# Patient Record
Sex: MALE | Race: WHITE | Employment: OTHER | URBAN - NONMETROPOLITAN AREA
[De-identification: names, ages, dates, MRNs, and addresses within clinical notes are randomized per-mention and may not be internally consistent; named-entity substitution may affect disease eponyms.]

---

## 2019-01-01 ENCOUNTER — APPOINTMENT (OUTPATIENT)
Dept: CT IMAGING | Age: 74
DRG: 871 | End: 2019-01-01
Payer: MEDICARE

## 2019-01-01 ENCOUNTER — APPOINTMENT (OUTPATIENT)
Dept: GENERAL RADIOLOGY | Age: 74
DRG: 871 | End: 2019-01-01
Payer: MEDICARE

## 2019-01-01 ENCOUNTER — APPOINTMENT (OUTPATIENT)
Dept: INTERVENTIONAL RADIOLOGY/VASCULAR | Age: 74
DRG: 871 | End: 2019-01-01
Payer: MEDICARE

## 2019-01-01 ENCOUNTER — HOSPITAL ENCOUNTER (INPATIENT)
Age: 74
LOS: 17 days | Discharge: NURSING, REHAB OR LONG-TERM CARE | DRG: 871 | End: 2019-09-06
Attending: FAMILY MEDICINE | Admitting: INTERNAL MEDICINE
Payer: MEDICARE

## 2019-01-01 ENCOUNTER — APPOINTMENT (OUTPATIENT)
Dept: ULTRASOUND IMAGING | Age: 74
DRG: 871 | End: 2019-01-01
Payer: MEDICARE

## 2019-01-01 ENCOUNTER — APPOINTMENT (OUTPATIENT)
Dept: NUCLEAR MEDICINE | Age: 74
DRG: 871 | End: 2019-01-01
Payer: MEDICARE

## 2019-01-01 VITALS
HEART RATE: 92 BPM | BODY MASS INDEX: 31.27 KG/M2 | HEIGHT: 72 IN | SYSTOLIC BLOOD PRESSURE: 98 MMHG | RESPIRATION RATE: 18 BRPM | TEMPERATURE: 97.6 F | WEIGHT: 230.9 LBS | OXYGEN SATURATION: 96 % | DIASTOLIC BLOOD PRESSURE: 56 MMHG

## 2019-01-01 DIAGNOSIS — J96.01 ACUTE RESPIRATORY FAILURE WITH HYPOXIA (HCC): ICD-10-CM

## 2019-01-01 DIAGNOSIS — J18.9 PNEUMONIA DUE TO ORGANISM: ICD-10-CM

## 2019-01-01 DIAGNOSIS — R50.81 FEVER IN OTHER DISEASES: Primary | ICD-10-CM

## 2019-01-01 DIAGNOSIS — C18.8 OVERLAPPING MALIGNANT NEOPLASM OF COLON (HCC): ICD-10-CM

## 2019-01-01 DIAGNOSIS — R22.1 NECK MASS: ICD-10-CM

## 2019-01-01 DIAGNOSIS — C78.7 LIVER METASTASES (HCC): ICD-10-CM

## 2019-01-01 DIAGNOSIS — R16.0 HEPATOMEGALY: ICD-10-CM

## 2019-01-01 DIAGNOSIS — K52.9 COLITIS: ICD-10-CM

## 2019-01-01 LAB
ABO: NORMAL
ADENOVIRUS F 40 41 PCR: NOT DETECTED
AFP-TUMOR MARKER: 5660 UG/L
ALBUMIN SERPL-MCNC: 2.3 G/DL (ref 3.5–5.1)
ALBUMIN SERPL-MCNC: 2.4 G/DL (ref 3.5–5.1)
ALBUMIN SERPL-MCNC: 2.4 G/DL (ref 3.5–5.1)
ALBUMIN SERPL-MCNC: 2.5 G/DL (ref 3.5–5.1)
ALBUMIN SERPL-MCNC: 2.7 G/DL (ref 3.5–5.1)
ALBUMIN SERPL-MCNC: 2.8 G/DL (ref 3.5–5.1)
ALP BLD-CCNC: 125 U/L (ref 38–126)
ALP BLD-CCNC: 144 U/L (ref 38–126)
ALP BLD-CCNC: 152 U/L (ref 38–126)
ALP BLD-CCNC: 85 U/L (ref 38–126)
ALP BLD-CCNC: 86 U/L (ref 38–126)
ALP BLD-CCNC: 89 U/L (ref 38–126)
ALT SERPL-CCNC: 7 U/L (ref 11–66)
ALT SERPL-CCNC: 7 U/L (ref 11–66)
ALT SERPL-CCNC: 9 U/L (ref 11–66)
ALT SERPL-CCNC: < 5 U/L (ref 11–66)
ANION GAP SERPL CALCULATED.3IONS-SCNC: 10 MEQ/L (ref 8–16)
ANION GAP SERPL CALCULATED.3IONS-SCNC: 10 MEQ/L (ref 8–16)
ANION GAP SERPL CALCULATED.3IONS-SCNC: 11 MEQ/L (ref 8–16)
ANION GAP SERPL CALCULATED.3IONS-SCNC: 12 MEQ/L (ref 8–16)
ANION GAP SERPL CALCULATED.3IONS-SCNC: 13 MEQ/L (ref 8–16)
ANION GAP SERPL CALCULATED.3IONS-SCNC: 17 MEQ/L (ref 8–16)
ANION GAP SERPL CALCULATED.3IONS-SCNC: 6 MEQ/L (ref 8–16)
ANION GAP SERPL CALCULATED.3IONS-SCNC: 8 MEQ/L (ref 8–16)
ANION GAP SERPL CALCULATED.3IONS-SCNC: 9 MEQ/L (ref 8–16)
ANION GAP SERPL CALCULATED.3IONS-SCNC: 9 MEQ/L (ref 8–16)
ANTIBODY SCREEN: NORMAL
AST SERPL-CCNC: 22 U/L (ref 5–40)
AST SERPL-CCNC: 23 U/L (ref 5–40)
AST SERPL-CCNC: 29 U/L (ref 5–40)
AST SERPL-CCNC: 33 U/L (ref 5–40)
AST SERPL-CCNC: 39 U/L (ref 5–40)
AST SERPL-CCNC: 44 U/L (ref 5–40)
ASTROVIRUS PCR: NOT DETECTED
BASOPHILS # BLD: 0.7 %
BASOPHILS # BLD: 1.2 %
BASOPHILS ABSOLUTE: 0 THOU/MM3 (ref 0–0.1)
BASOPHILS ABSOLUTE: 0.1 THOU/MM3 (ref 0–0.1)
BILIRUB SERPL-MCNC: 0.4 MG/DL (ref 0.3–1.2)
BILIRUB SERPL-MCNC: 0.5 MG/DL (ref 0.3–1.2)
BILIRUB SERPL-MCNC: 0.5 MG/DL (ref 0.3–1.2)
BILIRUB SERPL-MCNC: 0.7 MG/DL (ref 0.3–1.2)
BILIRUBIN DIRECT: 0.3 MG/DL (ref 0–0.3)
BILIRUBIN DIRECT: 0.3 MG/DL (ref 0–0.3)
BILIRUBIN DIRECT: 0.4 MG/DL (ref 0–0.3)
BILIRUBIN URINE: ABNORMAL
BLOOD, URINE: NEGATIVE
BODY FLUID RBC: 2000 /CUMM
BUN BLDV-MCNC: 10 MG/DL (ref 7–22)
BUN BLDV-MCNC: 11 MG/DL (ref 7–22)
BUN BLDV-MCNC: 12 MG/DL (ref 7–22)
BUN BLDV-MCNC: 14 MG/DL (ref 7–22)
BUN BLDV-MCNC: 16 MG/DL (ref 7–22)
BUN BLDV-MCNC: 21 MG/DL (ref 7–22)
BUN BLDV-MCNC: 8 MG/DL (ref 7–22)
BUN BLDV-MCNC: 9 MG/DL (ref 7–22)
CA 19-9: 19 U/ML (ref 0–35)
CALCIUM IONIZED: 1.23 MMOL/L (ref 1.12–1.32)
CALCIUM IONIZED: 1.28 MMOL/L (ref 1.12–1.32)
CALCIUM IONIZED: 1.3 MMOL/L (ref 1.12–1.32)
CALCIUM IONIZED: 1.31 MMOL/L (ref 1.12–1.32)
CALCIUM SERPL-MCNC: 8.3 MG/DL (ref 8.5–10.5)
CALCIUM SERPL-MCNC: 8.4 MG/DL (ref 8.5–10.5)
CALCIUM SERPL-MCNC: 8.6 MG/DL (ref 8.5–10.5)
CALCIUM SERPL-MCNC: 8.7 MG/DL (ref 8.5–10.5)
CALCIUM SERPL-MCNC: 8.7 MG/DL (ref 8.5–10.5)
CALCIUM SERPL-MCNC: 8.8 MG/DL (ref 8.5–10.5)
CALCIUM SERPL-MCNC: 8.9 MG/DL (ref 8.5–10.5)
CALCIUM SERPL-MCNC: 9.1 MG/DL (ref 8.5–10.5)
CALCIUM SERPL-MCNC: 9.3 MG/DL (ref 8.5–10.5)
CALCIUM SERPL-MCNC: 9.4 MG/DL (ref 8.5–10.5)
CALCIUM SERPL-MCNC: 9.8 MG/DL (ref 8.5–10.5)
CAMPYLOBACTER PCR: NOT DETECTED
CEA: 0.9 NG/ML (ref 0–5)
CHARACTER, BODY FLUID: NORMAL
CHARACTER, URINE: CLEAR
CHLORIDE BLD-SCNC: 100 MEQ/L (ref 98–111)
CHLORIDE BLD-SCNC: 100 MEQ/L (ref 98–111)
CHLORIDE BLD-SCNC: 101 MEQ/L (ref 98–111)
CHLORIDE BLD-SCNC: 102 MEQ/L (ref 98–111)
CHLORIDE BLD-SCNC: 105 MEQ/L (ref 98–111)
CHLORIDE BLD-SCNC: 105 MEQ/L (ref 98–111)
CHLORIDE BLD-SCNC: 106 MEQ/L (ref 98–111)
CHLORIDE BLD-SCNC: 108 MEQ/L (ref 98–111)
CHLORIDE BLD-SCNC: 98 MEQ/L (ref 98–111)
CHLORIDE BLD-SCNC: 98 MEQ/L (ref 98–111)
CHLORIDE BLD-SCNC: 99 MEQ/L (ref 98–111)
CLOSTRIDIUM DIFFICILE, PCR: NOT DETECTED
CO2: 19 MEQ/L (ref 23–33)
CO2: 23 MEQ/L (ref 23–33)
CO2: 23 MEQ/L (ref 23–33)
CO2: 24 MEQ/L (ref 23–33)
CO2: 25 MEQ/L (ref 23–33)
CO2: 25 MEQ/L (ref 23–33)
CO2: 26 MEQ/L (ref 23–33)
CO2: 26 MEQ/L (ref 23–33)
CO2: 27 MEQ/L (ref 23–33)
COLOR: YELLOW
COLOR: YELLOW
CREAT SERPL-MCNC: 0.3 MG/DL (ref 0.4–1.2)
CREAT SERPL-MCNC: 0.4 MG/DL (ref 0.4–1.2)
CREAT SERPL-MCNC: 0.6 MG/DL (ref 0.4–1.2)
CRYPTOSPORIDIUM PCR: NOT DETECTED
CYCLOSPORA CAYETANENSIS PCR: NOT DETECTED
E COLI 0157 PCR: NORMAL
E COLI ENTEROAGGREGATIVE PCR: NOT DETECTED
E COLI ENTEROPATHOGENIC PCR: NOT DETECTED
E COLI ENTEROTOXIGENIC PCR: NOT DETECTED
E COLI SHIGA LIKE TOXIN PCR: NOT DETECTED
E COLI SHIGELLA/ENTEROINVASIVE PCR: NOT DETECTED
E HISTOLYTICA GI FILM ARRAY: NOT DETECTED
EKG ATRIAL RATE: 67 BPM
EKG ATRIAL RATE: 91 BPM
EKG Q-T INTERVAL: 242 MS
EKG Q-T INTERVAL: 378 MS
EKG QRS DURATION: 80 MS
EKG QRS DURATION: 82 MS
EKG QTC CALCULATION (BAZETT): 346 MS
EKG QTC CALCULATION (BAZETT): 425 MS
EKG R AXIS: 37 DEGREES
EKG R AXIS: 8 DEGREES
EKG T AXIS: -135 DEGREES
EKG T AXIS: 113 DEGREES
EKG VENTRICULAR RATE: 123 BPM
EKG VENTRICULAR RATE: 76 BPM
EOSINOPHIL # BLD: 1 %
EOSINOPHIL # BLD: 1.2 %
EOSINOPHILS ABSOLUTE: 0.1 THOU/MM3 (ref 0–0.4)
EOSINOPHILS ABSOLUTE: 0.1 THOU/MM3 (ref 0–0.4)
ERYTHROCYTE [DISTWIDTH] IN BLOOD BY AUTOMATED COUNT: 14.7 % (ref 11.5–14.5)
ERYTHROCYTE [DISTWIDTH] IN BLOOD BY AUTOMATED COUNT: 14.8 % (ref 11.5–14.5)
ERYTHROCYTE [DISTWIDTH] IN BLOOD BY AUTOMATED COUNT: 14.8 % (ref 11.5–14.5)
ERYTHROCYTE [DISTWIDTH] IN BLOOD BY AUTOMATED COUNT: 14.9 % (ref 11.5–14.5)
ERYTHROCYTE [DISTWIDTH] IN BLOOD BY AUTOMATED COUNT: 14.9 % (ref 11.5–14.5)
ERYTHROCYTE [DISTWIDTH] IN BLOOD BY AUTOMATED COUNT: 15 % (ref 11.5–14.5)
ERYTHROCYTE [DISTWIDTH] IN BLOOD BY AUTOMATED COUNT: 15 % (ref 11.5–14.5)
ERYTHROCYTE [DISTWIDTH] IN BLOOD BY AUTOMATED COUNT: 15.1 % (ref 11.5–14.5)
ERYTHROCYTE [DISTWIDTH] IN BLOOD BY AUTOMATED COUNT: 15.4 % (ref 11.5–14.5)
ERYTHROCYTE [DISTWIDTH] IN BLOOD BY AUTOMATED COUNT: 54 FL (ref 35–45)
ERYTHROCYTE [DISTWIDTH] IN BLOOD BY AUTOMATED COUNT: 54.1 FL (ref 35–45)
ERYTHROCYTE [DISTWIDTH] IN BLOOD BY AUTOMATED COUNT: 54.2 FL (ref 35–45)
ERYTHROCYTE [DISTWIDTH] IN BLOOD BY AUTOMATED COUNT: 54.4 FL (ref 35–45)
ERYTHROCYTE [DISTWIDTH] IN BLOOD BY AUTOMATED COUNT: 54.6 FL (ref 35–45)
ERYTHROCYTE [DISTWIDTH] IN BLOOD BY AUTOMATED COUNT: 54.9 FL (ref 35–45)
ERYTHROCYTE [DISTWIDTH] IN BLOOD BY AUTOMATED COUNT: 55 FL (ref 35–45)
ERYTHROCYTE [DISTWIDTH] IN BLOOD BY AUTOMATED COUNT: 56.3 FL (ref 35–45)
ERYTHROCYTE [DISTWIDTH] IN BLOOD BY AUTOMATED COUNT: 58.3 FL (ref 35–45)
GFR SERPL CREATININE-BSD FRML MDRD: > 90 ML/MIN/1.73M2
GIARDIA LAMBLIA PCR: NOT DETECTED
GLUCOSE BLD-MCNC: 102 MG/DL (ref 70–108)
GLUCOSE BLD-MCNC: 102 MG/DL (ref 70–108)
GLUCOSE BLD-MCNC: 103 MG/DL (ref 70–108)
GLUCOSE BLD-MCNC: 104 MG/DL (ref 70–108)
GLUCOSE BLD-MCNC: 108 MG/DL (ref 70–108)
GLUCOSE BLD-MCNC: 109 MG/DL (ref 70–108)
GLUCOSE BLD-MCNC: 113 MG/DL (ref 70–108)
GLUCOSE BLD-MCNC: 114 MG/DL (ref 70–108)
GLUCOSE BLD-MCNC: 114 MG/DL (ref 70–108)
GLUCOSE BLD-MCNC: 115 MG/DL (ref 70–108)
GLUCOSE BLD-MCNC: 115 MG/DL (ref 70–108)
GLUCOSE BLD-MCNC: 117 MG/DL (ref 70–108)
GLUCOSE BLD-MCNC: 117 MG/DL (ref 70–108)
GLUCOSE BLD-MCNC: 120 MG/DL (ref 70–108)
GLUCOSE BLD-MCNC: 121 MG/DL (ref 70–108)
GLUCOSE BLD-MCNC: 121 MG/DL (ref 70–108)
GLUCOSE BLD-MCNC: 124 MG/DL (ref 70–108)
GLUCOSE BLD-MCNC: 125 MG/DL (ref 70–108)
GLUCOSE BLD-MCNC: 126 MG/DL (ref 70–108)
GLUCOSE BLD-MCNC: 126 MG/DL (ref 70–108)
GLUCOSE BLD-MCNC: 127 MG/DL (ref 70–108)
GLUCOSE BLD-MCNC: 128 MG/DL (ref 70–108)
GLUCOSE BLD-MCNC: 129 MG/DL (ref 70–108)
GLUCOSE BLD-MCNC: 130 MG/DL (ref 70–108)
GLUCOSE BLD-MCNC: 131 MG/DL (ref 70–108)
GLUCOSE BLD-MCNC: 131 MG/DL (ref 70–108)
GLUCOSE BLD-MCNC: 136 MG/DL (ref 70–108)
GLUCOSE BLD-MCNC: 138 MG/DL (ref 70–108)
GLUCOSE BLD-MCNC: 140 MG/DL (ref 70–108)
GLUCOSE BLD-MCNC: 141 MG/DL (ref 70–108)
GLUCOSE BLD-MCNC: 149 MG/DL (ref 70–108)
GLUCOSE BLD-MCNC: 156 MG/DL (ref 70–108)
GLUCOSE BLD-MCNC: 160 MG/DL (ref 70–108)
GLUCOSE BLD-MCNC: 162 MG/DL (ref 70–108)
GLUCOSE BLD-MCNC: 165 MG/DL (ref 70–108)
GLUCOSE BLD-MCNC: 166 MG/DL (ref 70–108)
GLUCOSE BLD-MCNC: 167 MG/DL (ref 70–108)
GLUCOSE BLD-MCNC: 228 MG/DL (ref 70–108)
GLUCOSE BLD-MCNC: 83 MG/DL (ref 70–108)
GLUCOSE BLD-MCNC: 90 MG/DL (ref 70–108)
GLUCOSE BLD-MCNC: 93 MG/DL (ref 70–108)
GLUCOSE BLD-MCNC: 99 MG/DL (ref 70–108)
GLUCOSE URINE: NEGATIVE MG/DL
GLUCOSE, FLUID: 133 MG/DL
HCT VFR BLD CALC: 29.4 % (ref 42–52)
HCT VFR BLD CALC: 29.9 % (ref 42–52)
HCT VFR BLD CALC: 31.9 % (ref 42–52)
HCT VFR BLD CALC: 32.2 % (ref 42–52)
HCT VFR BLD CALC: 32.5 % (ref 42–52)
HCT VFR BLD CALC: 32.7 % (ref 42–52)
HCT VFR BLD CALC: 32.8 % (ref 42–52)
HCT VFR BLD CALC: 33.3 % (ref 42–52)
HCT VFR BLD CALC: 33.8 % (ref 42–52)
HCT VFR BLD CALC: 34 % (ref 42–52)
HCT VFR BLD CALC: 34.4 % (ref 42–52)
HCT VFR BLD CALC: 34.6 % (ref 42–52)
HCT VFR BLD CALC: 35.3 % (ref 42–52)
HCT VFR BLD CALC: 38.3 % (ref 42–52)
HEMOCCULT STL QL: NEGATIVE
HEMOGLOBIN: 10 GM/DL (ref 14–18)
HEMOGLOBIN: 10.1 GM/DL (ref 14–18)
HEMOGLOBIN: 10.1 GM/DL (ref 14–18)
HEMOGLOBIN: 10.2 GM/DL (ref 14–18)
HEMOGLOBIN: 10.2 GM/DL (ref 14–18)
HEMOGLOBIN: 10.3 GM/DL (ref 14–18)
HEMOGLOBIN: 10.3 GM/DL (ref 14–18)
HEMOGLOBIN: 10.4 GM/DL (ref 14–18)
HEMOGLOBIN: 10.4 GM/DL (ref 14–18)
HEMOGLOBIN: 10.5 GM/DL (ref 14–18)
HEMOGLOBIN: 10.6 GM/DL (ref 14–18)
HEMOGLOBIN: 10.6 GM/DL (ref 14–18)
HEMOGLOBIN: 10.7 GM/DL (ref 14–18)
HEMOGLOBIN: 10.7 GM/DL (ref 14–18)
HEMOGLOBIN: 10.8 GM/DL (ref 14–18)
HEMOGLOBIN: 11 GM/DL (ref 14–18)
HEMOGLOBIN: 11 GM/DL (ref 14–18)
HEMOGLOBIN: 11.3 GM/DL (ref 14–18)
HEMOGLOBIN: 9.1 GM/DL (ref 14–18)
HEMOGLOBIN: 9.3 GM/DL (ref 14–18)
HEMOGLOBIN: 9.8 GM/DL (ref 14–18)
HEMOGLOBIN: 9.9 GM/DL (ref 14–18)
HEMOGLOBIN: 9.9 GM/DL (ref 14–18)
ICTOTEST: NEGATIVE
IMMATURE GRANS (ABS): 0.01 THOU/MM3 (ref 0–0.07)
IMMATURE GRANS (ABS): 0.03 THOU/MM3 (ref 0–0.07)
IMMATURE GRANULOCYTES: 0 %
IMMATURE GRANULOCYTES: 1 %
INR BLD: 1 (ref 0.85–1.13)
KETONES, URINE: 15
LACTIC ACID: 1.1 MMOL/L (ref 0.5–2.2)
LACTIC ACID: 2 MMOL/L (ref 0.5–2.2)
LD, FLUID: 55 U/L
LEUKOCYTE ESTERASE, URINE: NEGATIVE
LIPASE: 39.2 U/L (ref 5.6–51.3)
LV EF: 55 %
LVEF MODALITY: NORMAL
LYMPHOCYTES # BLD: 13.1 %
LYMPHOCYTES # BLD: 14.6 %
LYMPHOCYTES ABSOLUTE: 0.6 THOU/MM3 (ref 1–4.8)
LYMPHOCYTES ABSOLUTE: 0.7 THOU/MM3 (ref 1–4.8)
MAGNESIUM: 1.4 MG/DL (ref 1.6–2.4)
MAGNESIUM: 1.5 MG/DL (ref 1.6–2.4)
MAGNESIUM: 1.5 MG/DL (ref 1.6–2.4)
MAGNESIUM: 1.6 MG/DL (ref 1.6–2.4)
MAGNESIUM: 1.6 MG/DL (ref 1.6–2.4)
MAGNESIUM: 1.7 MG/DL (ref 1.6–2.4)
MAGNESIUM: 1.7 MG/DL (ref 1.6–2.4)
MAGNESIUM: 1.8 MG/DL (ref 1.6–2.4)
MAGNESIUM: 1.9 MG/DL (ref 1.6–2.4)
MCH RBC QN AUTO: 30.4 PG (ref 26–33)
MCH RBC QN AUTO: 30.4 PG (ref 26–33)
MCH RBC QN AUTO: 30.7 PG (ref 26–33)
MCH RBC QN AUTO: 30.8 PG (ref 26–33)
MCH RBC QN AUTO: 30.9 PG (ref 26–33)
MCH RBC QN AUTO: 31 PG (ref 26–33)
MCH RBC QN AUTO: 31.1 PG (ref 26–33)
MCH RBC QN AUTO: 31.1 PG (ref 26–33)
MCH RBC QN AUTO: 31.2 PG (ref 26–33)
MCH RBC QN AUTO: 31.2 PG (ref 26–33)
MCH RBC QN AUTO: 31.3 PG (ref 26–33)
MCHC RBC AUTO-ENTMCNC: 29.5 GM/DL (ref 32.2–35.5)
MCHC RBC AUTO-ENTMCNC: 30 GM/DL (ref 32.2–35.5)
MCHC RBC AUTO-ENTMCNC: 30.5 GM/DL (ref 32.2–35.5)
MCHC RBC AUTO-ENTMCNC: 30.8 GM/DL (ref 32.2–35.5)
MCHC RBC AUTO-ENTMCNC: 30.9 GM/DL (ref 32.2–35.5)
MCHC RBC AUTO-ENTMCNC: 30.9 GM/DL (ref 32.2–35.5)
MCHC RBC AUTO-ENTMCNC: 31 GM/DL (ref 32.2–35.5)
MCHC RBC AUTO-ENTMCNC: 31 GM/DL (ref 32.2–35.5)
MCHC RBC AUTO-ENTMCNC: 31.1 GM/DL (ref 32.2–35.5)
MCHC RBC AUTO-ENTMCNC: 31.2 GM/DL (ref 32.2–35.5)
MCHC RBC AUTO-ENTMCNC: 31.2 GM/DL (ref 32.2–35.5)
MCV RBC AUTO: 100 FL (ref 80–94)
MCV RBC AUTO: 100.3 FL (ref 80–94)
MCV RBC AUTO: 100.6 FL (ref 80–94)
MCV RBC AUTO: 100.7 FL (ref 80–94)
MCV RBC AUTO: 101.6 FL (ref 80–94)
MCV RBC AUTO: 106.1 FL (ref 80–94)
MCV RBC AUTO: 99.1 FL (ref 80–94)
MCV RBC AUTO: 99.1 FL (ref 80–94)
MCV RBC AUTO: 99.7 FL (ref 80–94)
MISC. #1 REFERENCE GROUP TEST: NORMAL
MISC. #2 REFERENCE REPORT: NORMAL
MONOCYTES # BLD: 7.2 %
MONOCYTES # BLD: 9.2 %
MONOCYTES ABSOLUTE: 0.3 THOU/MM3 (ref 0.4–1.3)
MONOCYTES ABSOLUTE: 0.5 THOU/MM3 (ref 0.4–1.3)
MONONUCLEAR CELLS BODY FLUID: 98.2 %
MRSA SCREEN RT-PCR: NEGATIVE
MRSA SCREEN: NORMAL
NITRITE, URINE: NEGATIVE
NOROVIRUS GI GII PCR: NOT DETECTED
NUCLEATED RED BLOOD CELLS: 0 /100 WBC
NUCLEATED RED BLOOD CELLS: 0 /100 WBC
OSMOLALITY CALCULATION: 270.4 MOSMOL/KG (ref 275–300)
OVA AND PARASITES: NORMAL
PATHOLOGIST REVIEW: NORMAL
PH UA: 5 (ref 5–9)
PHOSPHORUS: 1.9 MG/DL (ref 2.4–4.7)
PHOSPHORUS: 2.1 MG/DL (ref 2.4–4.7)
PHOSPHORUS: 2.1 MG/DL (ref 2.4–4.7)
PHOSPHORUS: 2.5 MG/DL (ref 2.4–4.7)
PHOSPHORUS: 2.8 MG/DL (ref 2.4–4.7)
PLATELET # BLD: 193 THOU/MM3 (ref 130–400)
PLATELET # BLD: 197 THOU/MM3 (ref 130–400)
PLATELET # BLD: 199 THOU/MM3 (ref 130–400)
PLATELET # BLD: 205 THOU/MM3 (ref 130–400)
PLATELET # BLD: 206 THOU/MM3 (ref 130–400)
PLATELET # BLD: 208 THOU/MM3 (ref 130–400)
PLATELET # BLD: 210 THOU/MM3 (ref 130–400)
PLATELET # BLD: 211 THOU/MM3 (ref 130–400)
PLATELET # BLD: 217 THOU/MM3 (ref 130–400)
PLATELET # BLD: 222 THOU/MM3 (ref 130–400)
PLATELET # BLD: 230 THOU/MM3 (ref 130–400)
PLESIOMONAS SHIGELLOIDES PCR: NOT DETECTED
PMV BLD AUTO: 8.9 FL (ref 9.4–12.4)
PMV BLD AUTO: 9 FL (ref 9.4–12.4)
PMV BLD AUTO: 9.1 FL (ref 9.4–12.4)
PMV BLD AUTO: 9.1 FL (ref 9.4–12.4)
PMV BLD AUTO: 9.2 FL (ref 9.4–12.4)
POLYMORPHONUCLEAR CELLS BODY FLUID: 1.8 %
POTASSIUM SERPL-SCNC: 3 MEQ/L (ref 3.5–5.2)
POTASSIUM SERPL-SCNC: 3 MEQ/L (ref 3.5–5.2)
POTASSIUM SERPL-SCNC: 3.1 MEQ/L (ref 3.5–5.2)
POTASSIUM SERPL-SCNC: 3.2 MEQ/L (ref 3.5–5.2)
POTASSIUM SERPL-SCNC: 3.3 MEQ/L (ref 3.5–5.2)
POTASSIUM SERPL-SCNC: 3.4 MEQ/L (ref 3.5–5.2)
POTASSIUM SERPL-SCNC: 3.4 MEQ/L (ref 3.5–5.2)
POTASSIUM SERPL-SCNC: 3.5 MEQ/L (ref 3.5–5.2)
POTASSIUM SERPL-SCNC: 3.6 MEQ/L (ref 3.5–5.2)
POTASSIUM SERPL-SCNC: 3.6 MEQ/L (ref 3.5–5.2)
POTASSIUM SERPL-SCNC: 3.7 MEQ/L (ref 3.5–5.2)
POTASSIUM SERPL-SCNC: 3.7 MEQ/L (ref 3.5–5.2)
POTASSIUM SERPL-SCNC: 3.8 MEQ/L (ref 3.5–5.2)
POTASSIUM SERPL-SCNC: 4 MEQ/L (ref 3.5–5.2)
POTASSIUM SERPL-SCNC: 5.2 MEQ/L (ref 3.5–5.2)
PROCALCITONIN: 0.33 NG/ML (ref 0.01–0.09)
PROCALCITONIN: 1.79 NG/ML (ref 0.01–0.09)
PROTEIN FLUID: 1.9 GM/DL
PROTEIN UA: NEGATIVE
RBC # BLD: 2.92 MILL/MM3 (ref 4.7–6.1)
RBC # BLD: 2.99 MILL/MM3 (ref 4.7–6.1)
RBC # BLD: 3.22 MILL/MM3 (ref 4.7–6.1)
RBC # BLD: 3.22 MILL/MM3 (ref 4.7–6.1)
RBC # BLD: 3.32 MILL/MM3 (ref 4.7–6.1)
RBC # BLD: 3.38 MILL/MM3 (ref 4.7–6.1)
RBC # BLD: 3.43 MILL/MM3 (ref 4.7–6.1)
RBC # BLD: 3.44 MILL/MM3 (ref 4.7–6.1)
RBC # BLD: 3.45 MILL/MM3 (ref 4.7–6.1)
RBC # BLD: 3.53 MILL/MM3 (ref 4.7–6.1)
RBC # BLD: 3.61 MILL/MM3 (ref 4.7–6.1)
RH FACTOR: NORMAL
ROTAVIRUS A PCR: NOT DETECTED
SALMONELLA PCR: NOT DETECTED
SAPOVIRUS PCR: NOT DETECTED
SEG NEUTROPHILS: 75.3 %
SEG NEUTROPHILS: 75.6 %
SEGMENTED NEUTROPHILS ABSOLUTE COUNT: 3.2 THOU/MM3 (ref 1.8–7.7)
SEGMENTED NEUTROPHILS ABSOLUTE COUNT: 3.8 THOU/MM3 (ref 1.8–7.7)
SODIUM BLD-SCNC: 133 MEQ/L (ref 135–145)
SODIUM BLD-SCNC: 134 MEQ/L (ref 135–145)
SODIUM BLD-SCNC: 134 MEQ/L (ref 135–145)
SODIUM BLD-SCNC: 136 MEQ/L (ref 135–145)
SODIUM BLD-SCNC: 137 MEQ/L (ref 135–145)
SODIUM BLD-SCNC: 138 MEQ/L (ref 135–145)
SODIUM BLD-SCNC: 138 MEQ/L (ref 135–145)
SODIUM BLD-SCNC: 139 MEQ/L (ref 135–145)
SODIUM BLD-SCNC: 140 MEQ/L (ref 135–145)
SPECIFIC GRAVITY, URINE: > 1.03 (ref 1–1.03)
SPECIMEN: NORMAL
T4 FREE: 1.12 NG/DL (ref 0.93–1.76)
TOTAL NUCLEATED CELLS BODY FLUID: 58 /CUMM (ref 0–500)
TOTAL PROTEIN: 5.5 G/DL (ref 6.1–8)
TOTAL PROTEIN: 5.7 G/DL (ref 6.1–8)
TOTAL PROTEIN: 5.9 G/DL (ref 6.1–8)
TOTAL PROTEIN: 6 G/DL (ref 6.1–8)
TOTAL PROTEIN: 6.1 G/DL (ref 6.1–8)
TOTAL PROTEIN: 6.8 G/DL (ref 6.1–8)
TOTAL VOLUME RECEIVED BODY FLUID: 83 ML
TROPONIN T: < 0.01 NG/ML
TSH SERPL DL<=0.05 MIU/L-ACNC: 7.16 UIU/ML (ref 0.4–4.2)
UROBILINOGEN, URINE: 0.2 EU/DL (ref 0–1)
VANCOMYCIN RESISTANT ENTEROCOCCUS: NEGATIVE
VIBRIO CHOLERAE PCR: NOT DETECTED
VIBRIO PCR: NOT DETECTED
WBC # BLD: 3.7 THOU/MM3 (ref 4.8–10.8)
WBC # BLD: 3.8 THOU/MM3 (ref 4.8–10.8)
WBC # BLD: 3.9 THOU/MM3 (ref 4.8–10.8)
WBC # BLD: 4 THOU/MM3 (ref 4.8–10.8)
WBC # BLD: 4.1 THOU/MM3 (ref 4.8–10.8)
WBC # BLD: 4.2 THOU/MM3 (ref 4.8–10.8)
WBC # BLD: 4.2 THOU/MM3 (ref 4.8–10.8)
WBC # BLD: 4.3 THOU/MM3 (ref 4.8–10.8)
WBC # BLD: 4.4 THOU/MM3 (ref 4.8–10.8)
WBC # BLD: 5 THOU/MM3 (ref 4.8–10.8)
WBC # BLD: 5 THOU/MM3 (ref 4.8–10.8)
YERSINIA ENTEROCOLITICA PCR: NOT DETECTED

## 2019-01-01 PROCEDURE — 97530 THERAPEUTIC ACTIVITIES: CPT

## 2019-01-01 PROCEDURE — 2060000000 HC ICU INTERMEDIATE R&B

## 2019-01-01 PROCEDURE — 85027 COMPLETE CBC AUTOMATED: CPT

## 2019-01-01 PROCEDURE — 47000 NEEDLE BIOPSY OF LIVER PERQ: CPT

## 2019-01-01 PROCEDURE — 2500000003 HC RX 250 WO HCPCS: Performed by: INTERNAL MEDICINE

## 2019-01-01 PROCEDURE — 6360000002 HC RX W HCPCS: Performed by: PHYSICIAN ASSISTANT

## 2019-01-01 PROCEDURE — 82330 ASSAY OF CALCIUM: CPT

## 2019-01-01 PROCEDURE — 6360000002 HC RX W HCPCS: Performed by: NURSE PRACTITIONER

## 2019-01-01 PROCEDURE — 80048 BASIC METABOLIC PNL TOTAL CA: CPT

## 2019-01-01 PROCEDURE — 6370000000 HC RX 637 (ALT 250 FOR IP): Performed by: INTERNAL MEDICINE

## 2019-01-01 PROCEDURE — 2709999900 HC NON-CHARGEABLE SUPPLY

## 2019-01-01 PROCEDURE — 85025 COMPLETE CBC W/AUTO DIFF WBC: CPT

## 2019-01-01 PROCEDURE — 82248 BILIRUBIN DIRECT: CPT

## 2019-01-01 PROCEDURE — 97110 THERAPEUTIC EXERCISES: CPT

## 2019-01-01 PROCEDURE — 99232 SBSQ HOSP IP/OBS MODERATE 35: CPT | Performed by: INTERNAL MEDICINE

## 2019-01-01 PROCEDURE — 83735 ASSAY OF MAGNESIUM: CPT

## 2019-01-01 PROCEDURE — 84157 ASSAY OF PROTEIN OTHER: CPT

## 2019-01-01 PROCEDURE — 36415 COLL VENOUS BLD VENIPUNCTURE: CPT

## 2019-01-01 PROCEDURE — C9113 INJ PANTOPRAZOLE SODIUM, VIA: HCPCS | Performed by: INTERNAL MEDICINE

## 2019-01-01 PROCEDURE — 51702 INSERT TEMP BLADDER CATH: CPT

## 2019-01-01 PROCEDURE — 84100 ASSAY OF PHOSPHORUS: CPT

## 2019-01-01 PROCEDURE — 6370000000 HC RX 637 (ALT 250 FOR IP)

## 2019-01-01 PROCEDURE — 6360000002 HC RX W HCPCS: Performed by: INTERNAL MEDICINE

## 2019-01-01 PROCEDURE — 99285 EMERGENCY DEPT VISIT HI MDM: CPT

## 2019-01-01 PROCEDURE — 78306 BONE IMAGING WHOLE BODY: CPT

## 2019-01-01 PROCEDURE — 6360000004 HC RX CONTRAST MEDICATION: Performed by: INTERNAL MEDICINE

## 2019-01-01 PROCEDURE — C9113 INJ PANTOPRAZOLE SODIUM, VIA: HCPCS | Performed by: NURSE PRACTITIONER

## 2019-01-01 PROCEDURE — 99232 SBSQ HOSP IP/OBS MODERATE 35: CPT | Performed by: SURGERY

## 2019-01-01 PROCEDURE — 71045 X-RAY EXAM CHEST 1 VIEW: CPT

## 2019-01-01 PROCEDURE — 82272 OCCULT BLD FECES 1-3 TESTS: CPT

## 2019-01-01 PROCEDURE — 74018 RADEX ABDOMEN 1 VIEW: CPT

## 2019-01-01 PROCEDURE — 88342 IMHCHEM/IMCYTCHM 1ST ANTB: CPT

## 2019-01-01 PROCEDURE — 99233 SBSQ HOSP IP/OBS HIGH 50: CPT | Performed by: PHYSICIAN ASSISTANT

## 2019-01-01 PROCEDURE — 97535 SELF CARE MNGMENT TRAINING: CPT

## 2019-01-01 PROCEDURE — 3609009500 HC COLONOSCOPY DIAGNOSTIC OR SCREENING: Performed by: INTERNAL MEDICINE

## 2019-01-01 PROCEDURE — C1729 CATH, DRAINAGE: HCPCS | Performed by: INTERNAL MEDICINE

## 2019-01-01 PROCEDURE — APPSS180 APP SPLIT SHARED TIME > 60 MINUTES: Performed by: PHYSICIAN ASSISTANT

## 2019-01-01 PROCEDURE — 99152 MOD SED SAME PHYS/QHP 5/>YRS: CPT | Performed by: INTERNAL MEDICINE

## 2019-01-01 PROCEDURE — 99233 SBSQ HOSP IP/OBS HIGH 50: CPT | Performed by: INTERNAL MEDICINE

## 2019-01-01 PROCEDURE — 82948 REAGENT STRIP/BLOOD GLUCOSE: CPT

## 2019-01-01 PROCEDURE — 2500000003 HC RX 250 WO HCPCS: Performed by: COUNSELOR

## 2019-01-01 PROCEDURE — 6370000000 HC RX 637 (ALT 250 FOR IP): Performed by: PHYSICIAN ASSISTANT

## 2019-01-01 PROCEDURE — 80053 COMPREHEN METABOLIC PANEL: CPT

## 2019-01-01 PROCEDURE — 83615 LACTATE (LD) (LDH) ENZYME: CPT

## 2019-01-01 PROCEDURE — 84484 ASSAY OF TROPONIN QUANT: CPT

## 2019-01-01 PROCEDURE — 36568 INSJ PICC <5 YR W/O IMAGING: CPT

## 2019-01-01 PROCEDURE — 99223 1ST HOSP IP/OBS HIGH 75: CPT | Performed by: SURGERY

## 2019-01-01 PROCEDURE — 2580000003 HC RX 258

## 2019-01-01 PROCEDURE — 77012 CT SCAN FOR NEEDLE BIOPSY: CPT

## 2019-01-01 PROCEDURE — 83690 ASSAY OF LIPASE: CPT

## 2019-01-01 PROCEDURE — 1200000000 HC SEMI PRIVATE

## 2019-01-01 PROCEDURE — 99232 SBSQ HOSP IP/OBS MODERATE 35: CPT | Performed by: FAMILY MEDICINE

## 2019-01-01 PROCEDURE — 81003 URINALYSIS AUTO W/O SCOPE: CPT

## 2019-01-01 PROCEDURE — 96374 THER/PROPH/DIAG INJ IV PUSH: CPT

## 2019-01-01 PROCEDURE — 6370000000 HC RX 637 (ALT 250 FOR IP): Performed by: NURSE PRACTITIONER

## 2019-01-01 PROCEDURE — 2500000003 HC RX 250 WO HCPCS: Performed by: PHYSICIAN ASSISTANT

## 2019-01-01 PROCEDURE — 1200000003 HC TELEMETRY R&B

## 2019-01-01 PROCEDURE — 2580000003 HC RX 258: Performed by: NURSE PRACTITIONER

## 2019-01-01 PROCEDURE — 84132 ASSAY OF SERUM POTASSIUM: CPT

## 2019-01-01 PROCEDURE — 6370000000 HC RX 637 (ALT 250 FOR IP): Performed by: RADIOLOGY

## 2019-01-01 PROCEDURE — 88341 IMHCHEM/IMCYTCHM EA ADD ANTB: CPT

## 2019-01-01 PROCEDURE — 82378 CARCINOEMBRYONIC ANTIGEN: CPT

## 2019-01-01 PROCEDURE — 99232 SBSQ HOSP IP/OBS MODERATE 35: CPT | Performed by: PAIN MEDICINE

## 2019-01-01 PROCEDURE — 84145 PROCALCITONIN (PCT): CPT

## 2019-01-01 PROCEDURE — 84443 ASSAY THYROID STIM HORMONE: CPT

## 2019-01-01 PROCEDURE — 2580000003 HC RX 258: Performed by: INTERNAL MEDICINE

## 2019-01-01 PROCEDURE — 6360000004 HC RX CONTRAST MEDICATION: Performed by: FAMILY MEDICINE

## 2019-01-01 PROCEDURE — 2580000003 HC RX 258: Performed by: RADIOLOGY

## 2019-01-01 PROCEDURE — 86850 RBC ANTIBODY SCREEN: CPT

## 2019-01-01 PROCEDURE — 88112 CYTOPATH CELL ENHANCE TECH: CPT

## 2019-01-01 PROCEDURE — 85018 HEMOGLOBIN: CPT

## 2019-01-01 PROCEDURE — 88334 PATH CONSLTJ SURG CYTO XM EA: CPT

## 2019-01-01 PROCEDURE — 82945 GLUCOSE OTHER FLUID: CPT

## 2019-01-01 PROCEDURE — 6360000002 HC RX W HCPCS

## 2019-01-01 PROCEDURE — 02HV33Z INSERTION OF INFUSION DEVICE INTO SUPERIOR VENA CAVA, PERCUTANEOUS APPROACH: ICD-10-PCS | Performed by: RADIOLOGY

## 2019-01-01 PROCEDURE — 87641 MR-STAPH DNA AMP PROBE: CPT

## 2019-01-01 PROCEDURE — 93306 TTE W/DOPPLER COMPLETE: CPT

## 2019-01-01 PROCEDURE — 2580000003 HC RX 258: Performed by: PHYSICIAN ASSISTANT

## 2019-01-01 PROCEDURE — 32555 ASPIRATE PLEURA W/ IMAGING: CPT

## 2019-01-01 PROCEDURE — 85014 HEMATOCRIT: CPT

## 2019-01-01 PROCEDURE — 2500000003 HC RX 250 WO HCPCS

## 2019-01-01 PROCEDURE — 87507 IADNA-DNA/RNA PROBE TQ 12-25: CPT

## 2019-01-01 PROCEDURE — 88333 PATH CONSLTJ SURG CYTO XM 1: CPT

## 2019-01-01 PROCEDURE — 93010 ELECTROCARDIOGRAM REPORT: CPT | Performed by: NUCLEAR MEDICINE

## 2019-01-01 PROCEDURE — 86301 IMMUNOASSAY TUMOR CA 19-9: CPT

## 2019-01-01 PROCEDURE — 2580000003 HC RX 258: Performed by: FAMILY MEDICINE

## 2019-01-01 PROCEDURE — 99153 MOD SED SAME PHYS/QHP EA: CPT | Performed by: INTERNAL MEDICINE

## 2019-01-01 PROCEDURE — 99222 1ST HOSP IP/OBS MODERATE 55: CPT | Performed by: PAIN MEDICINE

## 2019-01-01 PROCEDURE — 51798 US URINE CAPACITY MEASURE: CPT

## 2019-01-01 PROCEDURE — 93975 VASCULAR STUDY: CPT

## 2019-01-01 PROCEDURE — 82105 ALPHA-FETOPROTEIN SERUM: CPT

## 2019-01-01 PROCEDURE — A9503 TC99M MEDRONATE: HCPCS | Performed by: INTERNAL MEDICINE

## 2019-01-01 PROCEDURE — 93970 EXTREMITY STUDY: CPT

## 2019-01-01 PROCEDURE — 88307 TISSUE EXAM BY PATHOLOGIST: CPT

## 2019-01-01 PROCEDURE — 3430000000 HC RX DIAGNOSTIC RADIOPHARMACEUTICAL: Performed by: INTERNAL MEDICINE

## 2019-01-01 PROCEDURE — 6360000002 HC RX W HCPCS: Performed by: FAMILY MEDICINE

## 2019-01-01 PROCEDURE — 6370000000 HC RX 637 (ALT 250 FOR IP): Performed by: FAMILY MEDICINE

## 2019-01-01 PROCEDURE — 86901 BLOOD TYPING SEROLOGIC RH(D): CPT

## 2019-01-01 PROCEDURE — 83605 ASSAY OF LACTIC ACID: CPT

## 2019-01-01 PROCEDURE — 87177 OVA AND PARASITES SMEARS: CPT

## 2019-01-01 PROCEDURE — 99223 1ST HOSP IP/OBS HIGH 75: CPT | Performed by: INTERNAL MEDICINE

## 2019-01-01 PROCEDURE — 93010 ELECTROCARDIOGRAM REPORT: CPT | Performed by: INTERNAL MEDICINE

## 2019-01-01 PROCEDURE — 36592 COLLECT BLOOD FROM PICC: CPT

## 2019-01-01 PROCEDURE — 99231 SBSQ HOSP IP/OBS SF/LOW 25: CPT | Performed by: PHYSICIAN ASSISTANT

## 2019-01-01 PROCEDURE — 87500 VANOMYCIN DNA AMP PROBE: CPT

## 2019-01-01 PROCEDURE — C1751 CATH, INF, PER/CENT/MIDLINE: HCPCS

## 2019-01-01 PROCEDURE — 2720000010 HC SURG SUPPLY STERILE: Performed by: INTERNAL MEDICINE

## 2019-01-01 PROCEDURE — 3209999900 CT COMPARISON OF OUTSIDE FILMS

## 2019-01-01 PROCEDURE — 76536 US EXAM OF HEAD AND NECK: CPT

## 2019-01-01 PROCEDURE — 88305 TISSUE EXAM BY PATHOLOGIST: CPT

## 2019-01-01 PROCEDURE — 0D9N80Z DRAINAGE OF SIGMOID COLON WITH DRAINAGE DEVICE, VIA NATURAL OR ARTIFICIAL OPENING ENDOSCOPIC: ICD-10-PCS | Performed by: INTERNAL MEDICINE

## 2019-01-01 PROCEDURE — 93005 ELECTROCARDIOGRAM TRACING: CPT | Performed by: FAMILY MEDICINE

## 2019-01-01 PROCEDURE — 3209999900 US COMPARISON OF OUTSIDE FILMS

## 2019-01-01 PROCEDURE — 86900 BLOOD TYPING SEROLOGIC ABO: CPT

## 2019-01-01 PROCEDURE — 93005 ELECTROCARDIOGRAM TRACING: CPT

## 2019-01-01 PROCEDURE — 99239 HOSP IP/OBS DSCHRG MGMT >30: CPT | Performed by: INTERNAL MEDICINE

## 2019-01-01 PROCEDURE — 87081 CULTURE SCREEN ONLY: CPT

## 2019-01-01 PROCEDURE — 82565 ASSAY OF CREATININE: CPT

## 2019-01-01 PROCEDURE — 0FB13ZX EXCISION OF RIGHT LOBE LIVER, PERCUTANEOUS APPROACH, DIAGNOSTIC: ICD-10-PCS | Performed by: RADIOLOGY

## 2019-01-01 PROCEDURE — 89050 BODY FLUID CELL COUNT: CPT

## 2019-01-01 PROCEDURE — 97166 OT EVAL MOD COMPLEX 45 MIN: CPT

## 2019-01-01 PROCEDURE — 74177 CT ABD & PELVIS W/CONTRAST: CPT

## 2019-01-01 PROCEDURE — 76937 US GUIDE VASCULAR ACCESS: CPT

## 2019-01-01 PROCEDURE — 6360000002 HC RX W HCPCS: Performed by: RADIOLOGY

## 2019-01-01 PROCEDURE — 85610 PROTHROMBIN TIME: CPT

## 2019-01-01 PROCEDURE — 0W993ZZ DRAINAGE OF RIGHT PLEURAL CAVITY, PERCUTANEOUS APPROACH: ICD-10-PCS | Performed by: RADIOLOGY

## 2019-01-01 PROCEDURE — 51701 INSERT BLADDER CATHETER: CPT

## 2019-01-01 PROCEDURE — 99233 SBSQ HOSP IP/OBS HIGH 50: CPT | Performed by: FAMILY MEDICINE

## 2019-01-01 PROCEDURE — 97163 PT EVAL HIGH COMPLEX 45 MIN: CPT

## 2019-01-01 PROCEDURE — 84439 ASSAY OF FREE THYROXINE: CPT

## 2019-01-01 PROCEDURE — 87184 SC STD DISK METHOD PER PLATE: CPT

## 2019-01-01 PROCEDURE — 6360000002 HC RX W HCPCS: Performed by: NUCLEAR MEDICINE

## 2019-01-01 PROCEDURE — 2709999900 HC NON-CHARGEABLE SUPPLY: Performed by: INTERNAL MEDICINE

## 2019-01-01 PROCEDURE — 76705 ECHO EXAM OF ABDOMEN: CPT

## 2019-01-01 PROCEDURE — 99223 1ST HOSP IP/OBS HIGH 75: CPT | Performed by: NUCLEAR MEDICINE

## 2019-01-01 PROCEDURE — 71260 CT THORAX DX C+: CPT

## 2019-01-01 RX ORDER — FENTANYL CITRATE 50 UG/ML
50 INJECTION, SOLUTION INTRAMUSCULAR; INTRAVENOUS ONCE
Status: DISCONTINUED | OUTPATIENT
Start: 2019-01-01 | End: 2019-01-01

## 2019-01-01 RX ORDER — MIDAZOLAM HYDROCHLORIDE 1 MG/ML
INJECTION INTRAMUSCULAR; INTRAVENOUS PRN
Status: DISCONTINUED | OUTPATIENT
Start: 2019-01-01 | End: 2019-01-01 | Stop reason: HOSPADM

## 2019-01-01 RX ORDER — SODIUM CHLORIDE 0.9 % (FLUSH) 0.9 %
10 SYRINGE (ML) INJECTION EVERY 12 HOURS SCHEDULED
Status: DISCONTINUED | OUTPATIENT
Start: 2019-01-01 | End: 2019-01-01 | Stop reason: HOSPADM

## 2019-01-01 RX ORDER — MAGNESIUM SULFATE IN WATER 40 MG/ML
2 INJECTION, SOLUTION INTRAVENOUS ONCE
Status: COMPLETED | OUTPATIENT
Start: 2019-01-01 | End: 2019-01-01

## 2019-01-01 RX ORDER — MEGESTROL ACETATE 40 MG/ML
200 SUSPENSION ORAL 2 TIMES DAILY
Status: DISCONTINUED | OUTPATIENT
Start: 2019-01-01 | End: 2019-01-01

## 2019-01-01 RX ORDER — 0.9 % SODIUM CHLORIDE 0.9 %
500 INTRAVENOUS SOLUTION INTRAVENOUS ONCE
Status: COMPLETED | OUTPATIENT
Start: 2019-01-01 | End: 2019-01-01

## 2019-01-01 RX ORDER — OXYCODONE HCL 20 MG/1
20 TABLET, FILM COATED, EXTENDED RELEASE ORAL EVERY 12 HOURS SCHEDULED
Status: DISCONTINUED | OUTPATIENT
Start: 2019-01-01 | End: 2019-01-01 | Stop reason: HOSPADM

## 2019-01-01 RX ORDER — METOPROLOL SUCCINATE 100 MG/1
100 TABLET, EXTENDED RELEASE ORAL DAILY
Status: ON HOLD | COMMUNITY
End: 2019-01-01 | Stop reason: HOSPADM

## 2019-01-01 RX ORDER — OXYCODONE HYDROCHLORIDE 5 MG/1
2.5 TABLET ORAL
Status: DISCONTINUED | OUTPATIENT
Start: 2019-01-01 | End: 2019-01-01

## 2019-01-01 RX ORDER — 0.9 % SODIUM CHLORIDE 0.9 %
30 INTRAVENOUS SOLUTION INTRAVENOUS ONCE
Status: COMPLETED | OUTPATIENT
Start: 2019-01-01 | End: 2019-01-01

## 2019-01-01 RX ORDER — MIDODRINE HYDROCHLORIDE 5 MG/1
5 TABLET ORAL
Status: DISCONTINUED | OUTPATIENT
Start: 2019-01-01 | End: 2019-01-01

## 2019-01-01 RX ORDER — LIDOCAINE HYDROCHLORIDE 10 MG/ML
5 INJECTION, SOLUTION EPIDURAL; INFILTRATION; INTRACAUDAL; PERINEURAL ONCE
Status: DISCONTINUED | OUTPATIENT
Start: 2019-01-01 | End: 2019-01-01 | Stop reason: HOSPADM

## 2019-01-01 RX ORDER — DILTIAZEM HYDROCHLORIDE 180 MG/1
180 CAPSULE, COATED, EXTENDED RELEASE ORAL DAILY
Qty: 30 CAPSULE | Refills: 3 | DISCHARGE
Start: 2019-01-01

## 2019-01-01 RX ORDER — LISINOPRIL 40 MG/1
40 TABLET ORAL DAILY
Status: ON HOLD | COMMUNITY
End: 2019-01-01 | Stop reason: HOSPADM

## 2019-01-01 RX ORDER — LOPERAMIDE HYDROCHLORIDE 2 MG/1
2 CAPSULE ORAL 4 TIMES DAILY PRN
Status: DISCONTINUED | OUTPATIENT
Start: 2019-01-01 | End: 2019-01-01

## 2019-01-01 RX ORDER — POTASSIUM CHLORIDE 20 MEQ/1
40 TABLET, EXTENDED RELEASE ORAL ONCE
Status: COMPLETED | OUTPATIENT
Start: 2019-01-01 | End: 2019-01-01

## 2019-01-01 RX ORDER — DIGOXIN 0.25 MG/ML
125 INJECTION INTRAMUSCULAR; INTRAVENOUS ONCE
Status: DISCONTINUED | OUTPATIENT
Start: 2019-01-01 | End: 2019-01-01

## 2019-01-01 RX ORDER — MIDAZOLAM HYDROCHLORIDE 1 MG/ML
1 INJECTION INTRAMUSCULAR; INTRAVENOUS ONCE
Status: DISCONTINUED | OUTPATIENT
Start: 2019-01-01 | End: 2019-01-01

## 2019-01-01 RX ORDER — POTASSIUM CHLORIDE 1.5 G/1.77G
20 POWDER, FOR SOLUTION ORAL DAILY
Status: ON HOLD | COMMUNITY
End: 2019-01-01 | Stop reason: HOSPADM

## 2019-01-01 RX ORDER — OXYCODONE HCL 20 MG/1
20 TABLET, FILM COATED, EXTENDED RELEASE ORAL EVERY 12 HOURS SCHEDULED
Qty: 60 EACH | Refills: 0 | Status: SHIPPED | OUTPATIENT
Start: 2019-01-01 | End: 2019-10-05

## 2019-01-01 RX ORDER — AMIODARONE HYDROCHLORIDE 200 MG/1
200 TABLET ORAL DAILY
Status: DISCONTINUED | OUTPATIENT
Start: 2019-01-01 | End: 2019-01-01

## 2019-01-01 RX ORDER — 0.9 % SODIUM CHLORIDE 0.9 %
1000 INTRAVENOUS SOLUTION INTRAVENOUS ONCE
Status: COMPLETED | OUTPATIENT
Start: 2019-01-01 | End: 2019-01-01

## 2019-01-01 RX ORDER — POTASSIUM CHLORIDE 7.45 MG/ML
10 INJECTION INTRAVENOUS
Status: COMPLETED | OUTPATIENT
Start: 2019-01-01 | End: 2019-01-01

## 2019-01-01 RX ORDER — METOPROLOL SUCCINATE 100 MG/1
100 TABLET, EXTENDED RELEASE ORAL DAILY
Status: DISCONTINUED | OUTPATIENT
Start: 2019-01-01 | End: 2019-01-01

## 2019-01-01 RX ORDER — DIGOXIN 0.25 MG/ML
500 INJECTION INTRAMUSCULAR; INTRAVENOUS DAILY
Status: DISCONTINUED | OUTPATIENT
Start: 2019-01-01 | End: 2019-01-01

## 2019-01-01 RX ORDER — POLYETHYLENE GLYCOL 3350 17 G/17G
17 POWDER, FOR SOLUTION ORAL DAILY
Status: DISCONTINUED | OUTPATIENT
Start: 2019-01-01 | End: 2019-01-01

## 2019-01-01 RX ORDER — BUMETANIDE 0.25 MG/ML
0.5 INJECTION, SOLUTION INTRAMUSCULAR; INTRAVENOUS ONCE
Status: COMPLETED | OUTPATIENT
Start: 2019-01-01 | End: 2019-01-01

## 2019-01-01 RX ORDER — MIDAZOLAM HYDROCHLORIDE 1 MG/ML
1 INJECTION INTRAMUSCULAR; INTRAVENOUS ONCE
Status: CANCELLED | OUTPATIENT
Start: 2019-01-01 | End: 2019-01-01

## 2019-01-01 RX ORDER — IBUPROFEN 200 MG
TABLET ORAL ONCE
Status: COMPLETED | OUTPATIENT
Start: 2019-01-01 | End: 2019-01-01

## 2019-01-01 RX ORDER — POTASSIUM CHLORIDE 14.9 MG/ML
20 INJECTION INTRAVENOUS
Status: DISCONTINUED | OUTPATIENT
Start: 2019-01-01 | End: 2019-01-01 | Stop reason: SDUPTHER

## 2019-01-01 RX ORDER — ONDANSETRON 2 MG/ML
4 INJECTION INTRAMUSCULAR; INTRAVENOUS ONCE
Status: COMPLETED | OUTPATIENT
Start: 2019-01-01 | End: 2019-01-01

## 2019-01-01 RX ORDER — OXYCODONE HYDROCHLORIDE 5 MG/1
5 TABLET ORAL EVERY 4 HOURS PRN
Status: DISCONTINUED | OUTPATIENT
Start: 2019-01-01 | End: 2019-01-01 | Stop reason: HOSPADM

## 2019-01-01 RX ORDER — DEXTROSE, SODIUM CHLORIDE, AND POTASSIUM CHLORIDE 5; .9; .15 G/100ML; G/100ML; G/100ML
INJECTION INTRAVENOUS CONTINUOUS
Status: DISCONTINUED | OUTPATIENT
Start: 2019-01-01 | End: 2019-01-01

## 2019-01-01 RX ORDER — MIDAZOLAM HYDROCHLORIDE 1 MG/ML
0.5 INJECTION INTRAMUSCULAR; INTRAVENOUS ONCE
Status: COMPLETED | OUTPATIENT
Start: 2019-01-01 | End: 2019-01-01

## 2019-01-01 RX ORDER — PANTOPRAZOLE SODIUM 40 MG/10ML
40 INJECTION, POWDER, LYOPHILIZED, FOR SOLUTION INTRAVENOUS EVERY 12 HOURS
Status: DISCONTINUED | OUTPATIENT
Start: 2019-01-01 | End: 2019-01-01

## 2019-01-01 RX ORDER — BUMETANIDE 0.25 MG/ML
1 INJECTION, SOLUTION INTRAMUSCULAR; INTRAVENOUS ONCE
Status: COMPLETED | OUTPATIENT
Start: 2019-01-01 | End: 2019-01-01

## 2019-01-01 RX ORDER — FENTANYL CITRATE 50 UG/ML
25 INJECTION, SOLUTION INTRAMUSCULAR; INTRAVENOUS ONCE
Status: COMPLETED | OUTPATIENT
Start: 2019-01-01 | End: 2019-01-01

## 2019-01-01 RX ORDER — OXYCODONE HYDROCHLORIDE 5 MG/1
5 TABLET ORAL EVERY 4 HOURS PRN
Qty: 30 TABLET | Refills: 0 | Status: SHIPPED | OUTPATIENT
Start: 2019-01-01 | End: 2019-01-01

## 2019-01-01 RX ORDER — PANTOPRAZOLE SODIUM 40 MG/10ML
40 INJECTION, POWDER, LYOPHILIZED, FOR SOLUTION INTRAVENOUS 2 TIMES DAILY
Status: DISCONTINUED | OUTPATIENT
Start: 2019-01-01 | End: 2019-01-01

## 2019-01-01 RX ORDER — ACETAMINOPHEN 325 MG/1
650 TABLET ORAL EVERY 4 HOURS PRN
Status: DISCONTINUED | OUTPATIENT
Start: 2019-01-01 | End: 2019-01-01 | Stop reason: HOSPADM

## 2019-01-01 RX ORDER — OXYCODONE HCL 10 MG/1
10 TABLET, FILM COATED, EXTENDED RELEASE ORAL EVERY 12 HOURS SCHEDULED
Status: DISCONTINUED | OUTPATIENT
Start: 2019-01-01 | End: 2019-01-01

## 2019-01-01 RX ORDER — POLYETHYLENE GLYCOL 3350 17 G/17G
17 POWDER, FOR SOLUTION ORAL DAILY PRN
Status: DISCONTINUED | OUTPATIENT
Start: 2019-01-01 | End: 2019-01-01

## 2019-01-01 RX ORDER — POTASSIUM CHLORIDE 20 MEQ/1
20 TABLET, EXTENDED RELEASE ORAL 2 TIMES DAILY WITH MEALS
Status: DISCONTINUED | OUTPATIENT
Start: 2019-01-01 | End: 2019-01-01

## 2019-01-01 RX ORDER — OXYCODONE HCL 10 MG/1
15 TABLET, FILM COATED, EXTENDED RELEASE ORAL EVERY 12 HOURS SCHEDULED
Status: DISCONTINUED | OUTPATIENT
Start: 2019-01-01 | End: 2019-01-01

## 2019-01-01 RX ORDER — DILTIAZEM HYDROCHLORIDE 180 MG/1
180 CAPSULE, COATED, EXTENDED RELEASE ORAL DAILY
Status: DISCONTINUED | OUTPATIENT
Start: 2019-01-01 | End: 2019-01-01 | Stop reason: HOSPADM

## 2019-01-01 RX ORDER — ACETAMINOPHEN 500 MG
1000 TABLET ORAL ONCE
Status: COMPLETED | OUTPATIENT
Start: 2019-01-01 | End: 2019-01-01

## 2019-01-01 RX ORDER — DEXTROSE MONOHYDRATE 25 G/50ML
12.5 INJECTION, SOLUTION INTRAVENOUS PRN
Status: DISCONTINUED | OUTPATIENT
Start: 2019-01-01 | End: 2019-01-01 | Stop reason: HOSPADM

## 2019-01-01 RX ORDER — PANTOPRAZOLE SODIUM 40 MG/10ML
40 INJECTION, POWDER, LYOPHILIZED, FOR SOLUTION INTRAVENOUS DAILY
Status: DISCONTINUED | OUTPATIENT
Start: 2019-01-01 | End: 2019-01-01

## 2019-01-01 RX ORDER — OXYCODONE HYDROCHLORIDE 5 MG/1
5 TABLET ORAL
Status: DISCONTINUED | OUTPATIENT
Start: 2019-01-01 | End: 2019-01-01

## 2019-01-01 RX ORDER — SENNA PLUS 8.6 MG/1
1 TABLET ORAL NIGHTLY
Status: DISCONTINUED | OUTPATIENT
Start: 2019-01-01 | End: 2019-01-01

## 2019-01-01 RX ORDER — DIGOXIN 0.25 MG/ML
500 INJECTION INTRAMUSCULAR; INTRAVENOUS ONCE
Status: COMPLETED | OUTPATIENT
Start: 2019-01-01 | End: 2019-01-01

## 2019-01-01 RX ORDER — PANTOPRAZOLE SODIUM 40 MG/1
40 TABLET, DELAYED RELEASE ORAL
Status: DISCONTINUED | OUTPATIENT
Start: 2019-01-01 | End: 2019-01-01 | Stop reason: HOSPADM

## 2019-01-01 RX ORDER — METOPROLOL SUCCINATE 25 MG/1
25 TABLET, EXTENDED RELEASE ORAL DAILY
Status: DISCONTINUED | OUTPATIENT
Start: 2019-01-01 | End: 2019-01-01

## 2019-01-01 RX ORDER — SODIUM CHLORIDE 450 MG/100ML
INJECTION, SOLUTION INTRAVENOUS CONTINUOUS
Status: CANCELLED | OUTPATIENT
Start: 2019-01-01

## 2019-01-01 RX ORDER — NICOTINE POLACRILEX 4 MG
15 LOZENGE BUCCAL PRN
Status: DISCONTINUED | OUTPATIENT
Start: 2019-01-01 | End: 2019-01-01 | Stop reason: HOSPADM

## 2019-01-01 RX ORDER — SODIUM CHLORIDE 0.9 % (FLUSH) 0.9 %
10 SYRINGE (ML) INJECTION PRN
Status: DISCONTINUED | OUTPATIENT
Start: 2019-01-01 | End: 2019-01-01 | Stop reason: HOSPADM

## 2019-01-01 RX ORDER — LOPERAMIDE HYDROCHLORIDE 2 MG/1
2 CAPSULE ORAL 2 TIMES DAILY PRN
Status: DISCONTINUED | OUTPATIENT
Start: 2019-01-01 | End: 2019-01-01

## 2019-01-01 RX ORDER — LOPERAMIDE HYDROCHLORIDE 2 MG/1
2 CAPSULE ORAL 4 TIMES DAILY
Qty: 40 CAPSULE | Refills: 0 | DISCHARGE
Start: 2019-01-01 | End: 2019-01-01

## 2019-01-01 RX ORDER — ASPIRIN 325 MG
325 TABLET ORAL 2 TIMES DAILY
Status: ON HOLD | COMMUNITY
End: 2019-01-01 | Stop reason: HOSPADM

## 2019-01-01 RX ORDER — LOPERAMIDE HYDROCHLORIDE 2 MG/1
2 CAPSULE ORAL 4 TIMES DAILY
Status: DISCONTINUED | OUTPATIENT
Start: 2019-01-01 | End: 2019-01-01 | Stop reason: HOSPADM

## 2019-01-01 RX ORDER — PANTOPRAZOLE SODIUM 40 MG/1
40 TABLET, DELAYED RELEASE ORAL
Qty: 30 TABLET | Refills: 3 | DISCHARGE
Start: 2019-01-01

## 2019-01-01 RX ORDER — DOCUSATE SODIUM 100 MG/1
100 CAPSULE, LIQUID FILLED ORAL 2 TIMES DAILY
Status: DISCONTINUED | OUTPATIENT
Start: 2019-01-01 | End: 2019-01-01

## 2019-01-01 RX ORDER — NEOSTIGMINE METHYLSULFATE 1 MG/ML
0.5 INJECTION, SOLUTION INTRAVENOUS EVERY 24 HOURS
Status: DISCONTINUED | OUTPATIENT
Start: 2019-01-01 | End: 2019-01-01

## 2019-01-01 RX ORDER — SODIUM CHLORIDE, SODIUM LACTATE, POTASSIUM CHLORIDE, CALCIUM CHLORIDE 600; 310; 30; 20 MG/100ML; MG/100ML; MG/100ML; MG/100ML
INJECTION, SOLUTION INTRAVENOUS CONTINUOUS
Status: DISCONTINUED | OUTPATIENT
Start: 2019-01-01 | End: 2019-01-01

## 2019-01-01 RX ORDER — MIDODRINE HYDROCHLORIDE 10 MG/1
10 TABLET ORAL
DISCHARGE
Start: 2019-01-01

## 2019-01-01 RX ORDER — FENTANYL CITRATE 50 UG/ML
50 INJECTION, SOLUTION INTRAMUSCULAR; INTRAVENOUS ONCE
Status: CANCELLED | OUTPATIENT
Start: 2019-01-01 | End: 2019-01-01

## 2019-01-01 RX ORDER — TC 99M MEDRONATE 20 MG/10ML
27.5 INJECTION, POWDER, LYOPHILIZED, FOR SOLUTION INTRAVENOUS
Status: COMPLETED | OUTPATIENT
Start: 2019-01-01 | End: 2019-01-01

## 2019-01-01 RX ORDER — DEXTROSE MONOHYDRATE 50 MG/ML
100 INJECTION, SOLUTION INTRAVENOUS PRN
Status: DISCONTINUED | OUTPATIENT
Start: 2019-01-01 | End: 2019-01-01 | Stop reason: HOSPADM

## 2019-01-01 RX ORDER — HYDROCHLOROTHIAZIDE 25 MG/1
25 TABLET ORAL DAILY
Status: ON HOLD | COMMUNITY
End: 2019-01-01 | Stop reason: HOSPADM

## 2019-01-01 RX ORDER — ONDANSETRON 2 MG/ML
4 INJECTION INTRAMUSCULAR; INTRAVENOUS EVERY 6 HOURS PRN
Status: DISCONTINUED | OUTPATIENT
Start: 2019-01-01 | End: 2019-01-01 | Stop reason: HOSPADM

## 2019-01-01 RX ORDER — PRAVASTATIN SODIUM 10 MG
10 TABLET ORAL NIGHTLY
Status: ON HOLD | COMMUNITY
End: 2019-01-01 | Stop reason: HOSPADM

## 2019-01-01 RX ORDER — MIDODRINE HYDROCHLORIDE 10 MG/1
10 TABLET ORAL
Status: DISCONTINUED | OUTPATIENT
Start: 2019-01-01 | End: 2019-01-01 | Stop reason: HOSPADM

## 2019-01-01 RX ORDER — DILTIAZEM HYDROCHLORIDE 120 MG/1
120 CAPSULE, COATED, EXTENDED RELEASE ORAL DAILY
Status: DISCONTINUED | OUTPATIENT
Start: 2019-01-01 | End: 2019-01-01

## 2019-01-01 RX ORDER — DEXTROSE MONOHYDRATE 100 MG/ML
INJECTION, SOLUTION INTRAVENOUS CONTINUOUS
Status: ACTIVE | OUTPATIENT
Start: 2019-01-01 | End: 2019-01-01

## 2019-01-01 RX ORDER — SODIUM CHLORIDE 450 MG/100ML
INJECTION, SOLUTION INTRAVENOUS CONTINUOUS
Status: DISCONTINUED | OUTPATIENT
Start: 2019-01-01 | End: 2019-01-01 | Stop reason: HOSPADM

## 2019-01-01 RX ADMIN — DEXTROSE, SODIUM CHLORIDE, AND POTASSIUM CHLORIDE: 5; .9; .15 INJECTION INTRAVENOUS at 09:20

## 2019-01-01 RX ADMIN — ENOXAPARIN SODIUM 100 MG: 100 INJECTION SUBCUTANEOUS at 09:07

## 2019-01-01 RX ADMIN — PIPERACILLIN SODIUM,TAZOBACTAM SODIUM 3.38 G: 3; .375 INJECTION, POWDER, FOR SOLUTION INTRAVENOUS at 23:15

## 2019-01-01 RX ADMIN — Medication 10 ML: at 11:12

## 2019-01-01 RX ADMIN — PIPERACILLIN SODIUM,TAZOBACTAM SODIUM 3.38 G: 3; .375 INJECTION, POWDER, FOR SOLUTION INTRAVENOUS at 16:55

## 2019-01-01 RX ADMIN — SODIUM CHLORIDE: 234 INJECTION INTRAMUSCULAR; INTRAVENOUS; SUBCUTANEOUS at 18:24

## 2019-01-01 RX ADMIN — AMIODARONE HYDROCHLORIDE 200 MG: 200 TABLET ORAL at 08:51

## 2019-01-01 RX ADMIN — MIDODRINE HYDROCHLORIDE 10 MG: 10 TABLET ORAL at 18:45

## 2019-01-01 RX ADMIN — ACETAMINOPHEN 650 MG: 325 TABLET ORAL at 21:12

## 2019-01-01 RX ADMIN — PIPERACILLIN SODIUM,TAZOBACTAM SODIUM 3.38 G: 3; .375 INJECTION, POWDER, FOR SOLUTION INTRAVENOUS at 19:19

## 2019-01-01 RX ADMIN — MAGNESIUM SULFATE HEPTAHYDRATE 2 G: 40 INJECTION, SOLUTION INTRAVENOUS at 11:36

## 2019-01-01 RX ADMIN — POTASSIUM CHLORIDE 10 MEQ: 7.46 INJECTION, SOLUTION INTRAVENOUS at 10:16

## 2019-01-01 RX ADMIN — AMIODARONE HYDROCHLORIDE 200 MG: 200 TABLET ORAL at 08:41

## 2019-01-01 RX ADMIN — BUMETANIDE 1 MG: 0.25 INJECTION INTRAMUSCULAR; INTRAVENOUS at 09:35

## 2019-01-01 RX ADMIN — METOPROLOL TARTRATE 12.5 MG: 25 TABLET ORAL at 08:41

## 2019-01-01 RX ADMIN — PIPERACILLIN SODIUM,TAZOBACTAM SODIUM 3.38 G: 3; .375 INJECTION, POWDER, FOR SOLUTION INTRAVENOUS at 11:30

## 2019-01-01 RX ADMIN — SODIUM CHLORIDE 2586 ML: 900 INJECTION, SOLUTION INTRAVENOUS at 01:04

## 2019-01-01 RX ADMIN — OXYCODONE HYDROCHLORIDE 20 MG: 20 TABLET, FILM COATED, EXTENDED RELEASE ORAL at 20:15

## 2019-01-01 RX ADMIN — PANTOPRAZOLE SODIUM 40 MG: 40 TABLET, DELAYED RELEASE ORAL at 17:53

## 2019-01-01 RX ADMIN — POTASSIUM CHLORIDE 20 MEQ: 20 TABLET, EXTENDED RELEASE ORAL at 18:07

## 2019-01-01 RX ADMIN — DILTIAZEM HYDROCHLORIDE 30 MG: 30 TABLET, FILM COATED ORAL at 11:25

## 2019-01-01 RX ADMIN — ENOXAPARIN SODIUM 100 MG: 100 INJECTION SUBCUTANEOUS at 09:37

## 2019-01-01 RX ADMIN — MIDODRINE HYDROCHLORIDE 10 MG: 10 TABLET ORAL at 09:40

## 2019-01-01 RX ADMIN — DILTIAZEM HYDROCHLORIDE 30 MG: 30 TABLET, FILM COATED ORAL at 23:48

## 2019-01-01 RX ADMIN — POTASSIUM CHLORIDE 10 MEQ: 7.46 INJECTION, SOLUTION INTRAVENOUS at 18:02

## 2019-01-01 RX ADMIN — DILTIAZEM HYDROCHLORIDE 180 MG: 180 CAPSULE, COATED, EXTENDED RELEASE ORAL at 08:45

## 2019-01-01 RX ADMIN — AMIODARONE HYDROCHLORIDE 200 MG: 200 TABLET ORAL at 09:26

## 2019-01-01 RX ADMIN — OXYCODONE HYDROCHLORIDE 10 MG: 10 TABLET, FILM COATED, EXTENDED RELEASE ORAL at 20:22

## 2019-01-01 RX ADMIN — INSULIN LISPRO 2 UNITS: 100 INJECTION, SOLUTION INTRAVENOUS; SUBCUTANEOUS at 06:18

## 2019-01-01 RX ADMIN — LOPERAMIDE HYDROCHLORIDE 2 MG: 2 CAPSULE ORAL at 21:14

## 2019-01-01 RX ADMIN — OXYCODONE HYDROCHLORIDE 20 MG: 20 TABLET, FILM COATED, EXTENDED RELEASE ORAL at 21:14

## 2019-01-01 RX ADMIN — LIDOCAINE HYDROCHLORIDE: 20 JELLY TOPICAL at 14:21

## 2019-01-01 RX ADMIN — HYDROMORPHONE HYDROCHLORIDE 0.5 MG: 1 INJECTION, SOLUTION INTRAMUSCULAR; INTRAVENOUS; SUBCUTANEOUS at 23:23

## 2019-01-01 RX ADMIN — MIDODRINE HYDROCHLORIDE 10 MG: 10 TABLET ORAL at 16:46

## 2019-01-01 RX ADMIN — MIDODRINE HYDROCHLORIDE 10 MG: 10 TABLET ORAL at 09:32

## 2019-01-01 RX ADMIN — PANTOPRAZOLE SODIUM 40 MG: 40 TABLET, DELAYED RELEASE ORAL at 17:33

## 2019-01-01 RX ADMIN — INSULIN LISPRO 2 UNITS: 100 INJECTION, SOLUTION INTRAVENOUS; SUBCUTANEOUS at 13:29

## 2019-01-01 RX ADMIN — Medication 10 ML: at 21:37

## 2019-01-01 RX ADMIN — OXYCODONE HYDROCHLORIDE 10 MG: 10 TABLET, FILM COATED, EXTENDED RELEASE ORAL at 21:51

## 2019-01-01 RX ADMIN — PIPERACILLIN SODIUM,TAZOBACTAM SODIUM 3.38 G: 3; .375 INJECTION, POWDER, FOR SOLUTION INTRAVENOUS at 13:48

## 2019-01-01 RX ADMIN — OXYCODONE HYDROCHLORIDE 5 MG: 5 TABLET ORAL at 00:45

## 2019-01-01 RX ADMIN — Medication 10 ML: at 20:22

## 2019-01-01 RX ADMIN — DILTIAZEM HYDROCHLORIDE 30 MG: 30 TABLET, FILM COATED ORAL at 17:25

## 2019-01-01 RX ADMIN — ACETAMINOPHEN 650 MG: 325 TABLET ORAL at 09:50

## 2019-01-01 RX ADMIN — POTASSIUM CHLORIDE 10 MEQ: 7.46 INJECTION, SOLUTION INTRAVENOUS at 01:38

## 2019-01-01 RX ADMIN — INSULIN LISPRO 1 UNITS: 100 INJECTION, SOLUTION INTRAVENOUS; SUBCUTANEOUS at 06:26

## 2019-01-01 RX ADMIN — FENTANYL CITRATE 25 MCG: 50 INJECTION, SOLUTION INTRAMUSCULAR; INTRAVENOUS at 09:05

## 2019-01-01 RX ADMIN — Medication 10 ML: at 20:30

## 2019-01-01 RX ADMIN — DILTIAZEM HYDROCHLORIDE 30 MG: 30 TABLET, FILM COATED ORAL at 00:35

## 2019-01-01 RX ADMIN — AMIODARONE HYDROCHLORIDE 200 MG: 200 TABLET ORAL at 09:07

## 2019-01-01 RX ADMIN — MIDODRINE HYDROCHLORIDE 10 MG: 10 TABLET ORAL at 09:48

## 2019-01-01 RX ADMIN — PANTOPRAZOLE SODIUM 40 MG: 40 INJECTION, POWDER, FOR SOLUTION INTRAVENOUS at 02:06

## 2019-01-01 RX ADMIN — METOPROLOL TARTRATE 25 MG: 25 TABLET ORAL at 22:09

## 2019-01-01 RX ADMIN — SODIUM CHLORIDE 8 MG/HR: 9 INJECTION, SOLUTION INTRAVENOUS at 11:27

## 2019-01-01 RX ADMIN — MIDODRINE HYDROCHLORIDE 5 MG: 5 TABLET ORAL at 14:42

## 2019-01-01 RX ADMIN — METOPROLOL TARTRATE 12.5 MG: 25 TABLET ORAL at 20:22

## 2019-01-01 RX ADMIN — DILTIAZEM HYDROCHLORIDE 30 MG: 30 TABLET, FILM COATED ORAL at 00:10

## 2019-01-01 RX ADMIN — POTASSIUM CHLORIDE 10 MEQ: 7.46 INJECTION, SOLUTION INTRAVENOUS at 11:56

## 2019-01-01 RX ADMIN — LOPERAMIDE HYDROCHLORIDE 2 MG: 2 CAPSULE ORAL at 09:43

## 2019-01-01 RX ADMIN — METHYLNALTREXONE BROMIDE 12 MG: 12 INJECTION, SOLUTION SUBCUTANEOUS at 15:01

## 2019-01-01 RX ADMIN — MIDODRINE HYDROCHLORIDE 10 MG: 10 TABLET ORAL at 18:02

## 2019-01-01 RX ADMIN — OXYCODONE HYDROCHLORIDE 2.5 MG: 5 TABLET ORAL at 23:49

## 2019-01-01 RX ADMIN — MIDODRINE HYDROCHLORIDE 10 MG: 10 TABLET ORAL at 17:30

## 2019-01-01 RX ADMIN — MIDODRINE HYDROCHLORIDE 10 MG: 10 TABLET ORAL at 10:13

## 2019-01-01 RX ADMIN — AMIODARONE HYDROCHLORIDE 200 MG: 200 TABLET ORAL at 09:32

## 2019-01-01 RX ADMIN — LEUCINE, PHENYLALANINE, LYSINE, METHIONINE, ISOLEUCINE, VALINE, HISTIDINE, THREONINE, TRYPTOPHAN, ALANINE, GLYCINE, ARGININE, PROLINE, SERINE, TYROSINE, DEXTROSE: 365; 280; 290; 200; 300; 290; 240; 210; 90; 1035; 515; 575; 340; 250; 20; 15 INJECTION INTRAVENOUS at 22:23

## 2019-01-01 RX ADMIN — SENNOSIDES 8.6 MG: 8.6 TABLET, FILM COATED ORAL at 21:36

## 2019-01-01 RX ADMIN — AMIODARONE HYDROCHLORIDE 0.5 MG/MIN: 1.8 INJECTION, SOLUTION INTRAVENOUS at 23:34

## 2019-01-01 RX ADMIN — SODIUM CHLORIDE 8 MG/HR: 9 INJECTION, SOLUTION INTRAVENOUS at 19:19

## 2019-01-01 RX ADMIN — IOPAMIDOL 80 ML: 755 INJECTION, SOLUTION INTRAVENOUS at 01:50

## 2019-01-01 RX ADMIN — LOPERAMIDE HYDROCHLORIDE 2 MG: 2 CAPSULE ORAL at 14:50

## 2019-01-01 RX ADMIN — DEXTROSE, SODIUM CHLORIDE, AND POTASSIUM CHLORIDE: 5; .9; .15 INJECTION INTRAVENOUS at 12:09

## 2019-01-01 RX ADMIN — HYDROMORPHONE HYDROCHLORIDE 0.5 MG: 1 INJECTION, SOLUTION INTRAMUSCULAR; INTRAVENOUS; SUBCUTANEOUS at 17:06

## 2019-01-01 RX ADMIN — PANTOPRAZOLE SODIUM 40 MG: 40 TABLET, DELAYED RELEASE ORAL at 08:51

## 2019-01-01 RX ADMIN — PANTOPRAZOLE SODIUM 40 MG: 40 TABLET, DELAYED RELEASE ORAL at 05:25

## 2019-01-01 RX ADMIN — MAGNESIUM SULFATE HEPTAHYDRATE 2 G: 40 INJECTION, SOLUTION INTRAVENOUS at 14:43

## 2019-01-01 RX ADMIN — PIPERACILLIN SODIUM,TAZOBACTAM SODIUM 3.38 G: 3; .375 INJECTION, POWDER, FOR SOLUTION INTRAVENOUS at 13:03

## 2019-01-01 RX ADMIN — Medication 10 ML: at 09:53

## 2019-01-01 RX ADMIN — ENOXAPARIN SODIUM 100 MG: 100 INJECTION SUBCUTANEOUS at 20:22

## 2019-01-01 RX ADMIN — DILTIAZEM HYDROCHLORIDE 30 MG: 30 TABLET, FILM COATED ORAL at 06:09

## 2019-01-01 RX ADMIN — MIDODRINE HYDROCHLORIDE 10 MG: 10 TABLET ORAL at 12:14

## 2019-01-01 RX ADMIN — ONDANSETRON 4 MG: 2 INJECTION INTRAMUSCULAR; INTRAVENOUS at 01:38

## 2019-01-01 RX ADMIN — MIDODRINE HYDROCHLORIDE 10 MG: 10 TABLET ORAL at 12:39

## 2019-01-01 RX ADMIN — DOCUSATE SODIUM 100 MG: 100 CAPSULE, LIQUID FILLED ORAL at 21:31

## 2019-01-01 RX ADMIN — MIDODRINE HYDROCHLORIDE 10 MG: 10 TABLET ORAL at 08:51

## 2019-01-01 RX ADMIN — MIDODRINE HYDROCHLORIDE 10 MG: 10 TABLET ORAL at 18:25

## 2019-01-01 RX ADMIN — PIPERACILLIN SODIUM,TAZOBACTAM SODIUM 3.38 G: 3; .375 INJECTION, POWDER, FOR SOLUTION INTRAVENOUS at 16:00

## 2019-01-01 RX ADMIN — POTASSIUM CHLORIDE 10 MEQ: 7.46 INJECTION, SOLUTION INTRAVENOUS at 15:52

## 2019-01-01 RX ADMIN — MIDODRINE HYDROCHLORIDE 10 MG: 10 TABLET ORAL at 13:29

## 2019-01-01 RX ADMIN — POTASSIUM CHLORIDE 10 MEQ: 7.46 INJECTION, SOLUTION INTRAVENOUS at 18:06

## 2019-01-01 RX ADMIN — SENNOSIDES 8.6 MG: 8.6 TABLET, FILM COATED ORAL at 21:51

## 2019-01-01 RX ADMIN — DILTIAZEM HYDROCHLORIDE 120 MG: 120 CAPSULE, EXTENDED RELEASE ORAL at 09:07

## 2019-01-01 RX ADMIN — SODIUM CHLORIDE: 234 INJECTION INTRAMUSCULAR; INTRAVENOUS; SUBCUTANEOUS at 17:34

## 2019-01-01 RX ADMIN — PANTOPRAZOLE SODIUM 40 MG: 40 INJECTION, POWDER, FOR SOLUTION INTRAVENOUS at 02:28

## 2019-01-01 RX ADMIN — PANTOPRAZOLE SODIUM 40 MG: 40 INJECTION, POWDER, FOR SOLUTION INTRAVENOUS at 15:55

## 2019-01-01 RX ADMIN — AMIODARONE HYDROCHLORIDE 0.5 MG/MIN: 1.8 INJECTION, SOLUTION INTRAVENOUS at 12:02

## 2019-01-01 RX ADMIN — BUMETANIDE 1 MG: 0.25 INJECTION INTRAMUSCULAR; INTRAVENOUS at 09:48

## 2019-01-01 RX ADMIN — POTASSIUM CHLORIDE 40 MEQ: 1500 TABLET, EXTENDED RELEASE ORAL at 14:41

## 2019-01-01 RX ADMIN — DILTIAZEM HYDROCHLORIDE 180 MG: 180 CAPSULE, COATED, EXTENDED RELEASE ORAL at 08:51

## 2019-01-01 RX ADMIN — PIPERACILLIN SODIUM,TAZOBACTAM SODIUM 3.38 G: 3; .375 INJECTION, POWDER, FOR SOLUTION INTRAVENOUS at 11:20

## 2019-01-01 RX ADMIN — ENOXAPARIN SODIUM 100 MG: 100 INJECTION SUBCUTANEOUS at 21:58

## 2019-01-01 RX ADMIN — LOPERAMIDE HYDROCHLORIDE 2 MG: 2 CAPSULE ORAL at 09:40

## 2019-01-01 RX ADMIN — Medication 10 ML: at 09:46

## 2019-01-01 RX ADMIN — POTASSIUM CHLORIDE 10 MEQ: 7.46 INJECTION, SOLUTION INTRAVENOUS at 19:09

## 2019-01-01 RX ADMIN — POTASSIUM CHLORIDE 10 MEQ: 7.46 INJECTION, SOLUTION INTRAVENOUS at 19:24

## 2019-01-01 RX ADMIN — AMIODARONE HYDROCHLORIDE 200 MG: 200 TABLET ORAL at 11:55

## 2019-01-01 RX ADMIN — DILTIAZEM HYDROCHLORIDE 30 MG: 30 TABLET, FILM COATED ORAL at 18:45

## 2019-01-01 RX ADMIN — HYDROMORPHONE HYDROCHLORIDE 0.5 MG: 1 INJECTION, SOLUTION INTRAMUSCULAR; INTRAVENOUS; SUBCUTANEOUS at 12:32

## 2019-01-01 RX ADMIN — NEOSTIGMINE METHYLSULFATE 0.5 MG: 1 INJECTION, SOLUTION INTRAVENOUS at 14:10

## 2019-01-01 RX ADMIN — MIDODRINE HYDROCHLORIDE 10 MG: 10 TABLET ORAL at 18:07

## 2019-01-01 RX ADMIN — METOPROLOL TARTRATE 12.5 MG: 25 TABLET ORAL at 08:52

## 2019-01-01 RX ADMIN — Medication 10 ML: at 22:03

## 2019-01-01 RX ADMIN — MIDODRINE HYDROCHLORIDE 10 MG: 10 TABLET ORAL at 09:26

## 2019-01-01 RX ADMIN — AMIODARONE HYDROCHLORIDE 200 MG: 200 TABLET ORAL at 10:14

## 2019-01-01 RX ADMIN — PIPERACILLIN SODIUM,TAZOBACTAM SODIUM 3.38 G: 3; .375 INJECTION, POWDER, FOR SOLUTION INTRAVENOUS at 18:08

## 2019-01-01 RX ADMIN — POTASSIUM CHLORIDE 10 MEQ: 7.46 INJECTION, SOLUTION INTRAVENOUS at 14:21

## 2019-01-01 RX ADMIN — ENOXAPARIN SODIUM 100 MG: 100 INJECTION SUBCUTANEOUS at 21:31

## 2019-01-01 RX ADMIN — PIPERACILLIN SODIUM,TAZOBACTAM SODIUM 3.38 G: 3; .375 INJECTION, POWDER, FOR SOLUTION INTRAVENOUS at 04:43

## 2019-01-01 RX ADMIN — Medication 10 ML: at 20:15

## 2019-01-01 RX ADMIN — POTASSIUM CHLORIDE 10 MEQ: 7.46 INJECTION, SOLUTION INTRAVENOUS at 20:09

## 2019-01-01 RX ADMIN — AMIODARONE HYDROCHLORIDE 0.5 MG/MIN: 1.8 INJECTION, SOLUTION INTRAVENOUS at 11:27

## 2019-01-01 RX ADMIN — AMIODARONE HYDROCHLORIDE 200 MG: 200 TABLET ORAL at 10:58

## 2019-01-01 RX ADMIN — METOPROLOL TARTRATE 12.5 MG: 25 TABLET ORAL at 15:27

## 2019-01-01 RX ADMIN — OXYCODONE HYDROCHLORIDE 5 MG: 5 TABLET ORAL at 16:02

## 2019-01-01 RX ADMIN — PIPERACILLIN SODIUM,TAZOBACTAM SODIUM 3.38 G: 3; .375 INJECTION, POWDER, FOR SOLUTION INTRAVENOUS at 21:01

## 2019-01-01 RX ADMIN — AMIODARONE HYDROCHLORIDE 0.5 MG/MIN: 1.8 INJECTION, SOLUTION INTRAVENOUS at 11:15

## 2019-01-01 RX ADMIN — DEXTROSE, SODIUM CHLORIDE, AND POTASSIUM CHLORIDE: 5; .9; .15 INJECTION INTRAVENOUS at 21:17

## 2019-01-01 RX ADMIN — PIPERACILLIN SODIUM,TAZOBACTAM SODIUM 3.38 G: 3; .375 INJECTION, POWDER, FOR SOLUTION INTRAVENOUS at 01:07

## 2019-01-01 RX ADMIN — AMIODARONE HYDROCHLORIDE 200 MG: 200 TABLET ORAL at 14:42

## 2019-01-01 RX ADMIN — DILTIAZEM HYDROCHLORIDE 30 MG: 30 TABLET, FILM COATED ORAL at 06:54

## 2019-01-01 RX ADMIN — DILTIAZEM HYDROCHLORIDE 30 MG: 30 TABLET, FILM COATED ORAL at 18:02

## 2019-01-01 RX ADMIN — METHYLNALTREXONE BROMIDE 12 MG: 12 INJECTION, SOLUTION SUBCUTANEOUS at 10:16

## 2019-01-01 RX ADMIN — METHYLNALTREXONE BROMIDE 12 MG: 12 INJECTION, SOLUTION SUBCUTANEOUS at 09:50

## 2019-01-01 RX ADMIN — MIDODRINE HYDROCHLORIDE 10 MG: 10 TABLET ORAL at 12:21

## 2019-01-01 RX ADMIN — DOCUSATE SODIUM 100 MG: 100 CAPSULE, LIQUID FILLED ORAL at 21:51

## 2019-01-01 RX ADMIN — DILTIAZEM HYDROCHLORIDE 180 MG: 180 CAPSULE, COATED, EXTENDED RELEASE ORAL at 09:40

## 2019-01-01 RX ADMIN — POTASSIUM CHLORIDE 10 MEQ: 7.46 INJECTION, SOLUTION INTRAVENOUS at 14:45

## 2019-01-01 RX ADMIN — POTASSIUM CHLORIDE 10 MEQ: 7.46 INJECTION, SOLUTION INTRAVENOUS at 07:14

## 2019-01-01 RX ADMIN — HYDROMORPHONE HYDROCHLORIDE 0.5 MG: 1 INJECTION, SOLUTION INTRAMUSCULAR; INTRAVENOUS; SUBCUTANEOUS at 22:28

## 2019-01-01 RX ADMIN — MIDODRINE HYDROCHLORIDE 10 MG: 10 TABLET ORAL at 11:16

## 2019-01-01 RX ADMIN — DEXTROSE MONOHYDRATE: 100 INJECTION, SOLUTION INTRAVENOUS at 13:28

## 2019-01-01 RX ADMIN — DILTIAZEM HYDROCHLORIDE 120 MG: 120 CAPSULE, EXTENDED RELEASE ORAL at 11:55

## 2019-01-01 RX ADMIN — METOPROLOL TARTRATE 12.5 MG: 25 TABLET ORAL at 20:15

## 2019-01-01 RX ADMIN — OXYCODONE HYDROCHLORIDE 20 MG: 20 TABLET, FILM COATED, EXTENDED RELEASE ORAL at 08:51

## 2019-01-01 RX ADMIN — PIPERACILLIN SODIUM,TAZOBACTAM SODIUM 3.38 G: 3; .375 INJECTION, POWDER, FOR SOLUTION INTRAVENOUS at 03:51

## 2019-01-01 RX ADMIN — PANTOPRAZOLE SODIUM 40 MG: 40 TABLET, DELAYED RELEASE ORAL at 05:33

## 2019-01-01 RX ADMIN — DILTIAZEM HYDROCHLORIDE 30 MG: 30 TABLET, FILM COATED ORAL at 06:05

## 2019-01-01 RX ADMIN — DILTIAZEM HYDROCHLORIDE 30 MG: 30 TABLET, FILM COATED ORAL at 14:42

## 2019-01-01 RX ADMIN — MIDODRINE HYDROCHLORIDE 10 MG: 10 TABLET ORAL at 13:11

## 2019-01-01 RX ADMIN — NEOSTIGMINE METHYLSULFATE 0.5 MG: 1 INJECTION, SOLUTION INTRAVENOUS at 15:00

## 2019-01-01 RX ADMIN — OXYCODONE HYDROCHLORIDE 20 MG: 20 TABLET, FILM COATED, EXTENDED RELEASE ORAL at 09:58

## 2019-01-01 RX ADMIN — HYDROMORPHONE HYDROCHLORIDE 0.5 MG: 1 INJECTION, SOLUTION INTRAMUSCULAR; INTRAVENOUS; SUBCUTANEOUS at 05:48

## 2019-01-01 RX ADMIN — Medication 10 ML: at 09:51

## 2019-01-01 RX ADMIN — METOPROLOL TARTRATE 12.5 MG: 25 TABLET ORAL at 09:31

## 2019-01-01 RX ADMIN — SENNOSIDES 8.6 MG: 8.6 TABLET, FILM COATED ORAL at 20:15

## 2019-01-01 RX ADMIN — BUMETANIDE 0.5 MG: 0.25 INJECTION INTRAMUSCULAR; INTRAVENOUS at 11:40

## 2019-01-01 RX ADMIN — MAGNESIUM SULFATE HEPTAHYDRATE 2 G: 40 INJECTION, SOLUTION INTRAVENOUS at 05:03

## 2019-01-01 RX ADMIN — SODIUM CHLORIDE 1000 ML: 9 INJECTION, SOLUTION INTRAVENOUS at 12:56

## 2019-01-01 RX ADMIN — OXYCODONE HYDROCHLORIDE 2.5 MG: 5 TABLET ORAL at 13:23

## 2019-01-01 RX ADMIN — PIPERACILLIN SODIUM,TAZOBACTAM SODIUM 3.38 G: 3; .375 INJECTION, POWDER, FOR SOLUTION INTRAVENOUS at 04:14

## 2019-01-01 RX ADMIN — MIDODRINE HYDROCHLORIDE 10 MG: 10 TABLET ORAL at 15:05

## 2019-01-01 RX ADMIN — MIDODRINE HYDROCHLORIDE 10 MG: 10 TABLET ORAL at 08:41

## 2019-01-01 RX ADMIN — POTASSIUM CHLORIDE 10 MEQ: 7.46 INJECTION, SOLUTION INTRAVENOUS at 11:30

## 2019-01-01 RX ADMIN — SODIUM CHLORIDE 8 MG/HR: 9 INJECTION, SOLUTION INTRAVENOUS at 16:02

## 2019-01-01 RX ADMIN — BACITRACIN, NEOMYCIN, POLYMYXIN B 1 G: 400; 3.5; 5 OINTMENT TOPICAL at 09:39

## 2019-01-01 RX ADMIN — DOCUSATE SODIUM 100 MG: 100 CAPSULE, LIQUID FILLED ORAL at 09:07

## 2019-01-01 RX ADMIN — MIDODRINE HYDROCHLORIDE 10 MG: 10 TABLET ORAL at 12:00

## 2019-01-01 RX ADMIN — LOPERAMIDE HYDROCHLORIDE 2 MG: 2 CAPSULE ORAL at 18:26

## 2019-01-01 RX ADMIN — HYDROMORPHONE HYDROCHLORIDE 0.5 MG: 1 INJECTION, SOLUTION INTRAMUSCULAR; INTRAVENOUS; SUBCUTANEOUS at 12:16

## 2019-01-01 RX ADMIN — INSULIN LISPRO 2 UNITS: 100 INJECTION, SOLUTION INTRAVENOUS; SUBCUTANEOUS at 17:30

## 2019-01-01 RX ADMIN — SENNOSIDES 8.6 MG: 8.6 TABLET, FILM COATED ORAL at 20:23

## 2019-01-01 RX ADMIN — PANTOPRAZOLE SODIUM 40 MG: 40 TABLET, DELAYED RELEASE ORAL at 16:59

## 2019-01-01 RX ADMIN — PANTOPRAZOLE SODIUM 40 MG: 40 INJECTION, POWDER, FOR SOLUTION INTRAVENOUS at 14:10

## 2019-01-01 RX ADMIN — DILTIAZEM HYDROCHLORIDE 30 MG: 30 TABLET, FILM COATED ORAL at 18:20

## 2019-01-01 RX ADMIN — MIDODRINE HYDROCHLORIDE 10 MG: 10 TABLET ORAL at 12:18

## 2019-01-01 RX ADMIN — ACETAMINOPHEN 650 MG: 325 TABLET ORAL at 12:40

## 2019-01-01 RX ADMIN — MIDODRINE HYDROCHLORIDE 10 MG: 10 TABLET ORAL at 07:52

## 2019-01-01 RX ADMIN — OXYCODONE HYDROCHLORIDE 5 MG: 5 TABLET ORAL at 00:15

## 2019-01-01 RX ADMIN — DILTIAZEM HYDROCHLORIDE 30 MG: 30 TABLET, FILM COATED ORAL at 00:25

## 2019-01-01 RX ADMIN — MIDODRINE HYDROCHLORIDE 5 MG: 5 TABLET ORAL at 08:52

## 2019-01-01 RX ADMIN — MIDODRINE HYDROCHLORIDE 10 MG: 10 TABLET ORAL at 11:25

## 2019-01-01 RX ADMIN — ACETAMINOPHEN 650 MG: 325 TABLET ORAL at 15:05

## 2019-01-01 RX ADMIN — OXYCODONE HYDROCHLORIDE 20 MG: 20 TABLET, FILM COATED, EXTENDED RELEASE ORAL at 22:03

## 2019-01-01 RX ADMIN — OXYCODONE HYDROCHLORIDE 10 MG: 10 TABLET, FILM COATED, EXTENDED RELEASE ORAL at 09:44

## 2019-01-01 RX ADMIN — Medication 0.9 G: at 09:16

## 2019-01-01 RX ADMIN — METHYLNALTREXONE BROMIDE 12 MG: 12 INJECTION, SOLUTION SUBCUTANEOUS at 09:43

## 2019-01-01 RX ADMIN — DILTIAZEM HYDROCHLORIDE 30 MG: 30 TABLET, FILM COATED ORAL at 10:15

## 2019-01-01 RX ADMIN — Medication 10 ML: at 10:16

## 2019-01-01 RX ADMIN — METOPROLOL TARTRATE 12.5 MG: 25 TABLET ORAL at 09:07

## 2019-01-01 RX ADMIN — MIDODRINE HYDROCHLORIDE 10 MG: 10 TABLET ORAL at 18:20

## 2019-01-01 RX ADMIN — SODIUM PHOSPHATE, MONOBASIC, MONOHYDRATE 15 MMOL: 276; 142 INJECTION, SOLUTION INTRAVENOUS at 11:28

## 2019-01-01 RX ADMIN — DILTIAZEM HYDROCHLORIDE 30 MG: 30 TABLET, FILM COATED ORAL at 11:27

## 2019-01-01 RX ADMIN — POTASSIUM CHLORIDE 10 MEQ: 7.46 INJECTION, SOLUTION INTRAVENOUS at 14:01

## 2019-01-01 RX ADMIN — SODIUM CHLORIDE 8 MG/HR: 9 INJECTION, SOLUTION INTRAVENOUS at 01:01

## 2019-01-01 RX ADMIN — DILTIAZEM HYDROCHLORIDE 180 MG: 180 CAPSULE, COATED, EXTENDED RELEASE ORAL at 11:09

## 2019-01-01 RX ADMIN — POTASSIUM BICARBONATE 40 MEQ: 782 TABLET, EFFERVESCENT ORAL at 21:13

## 2019-01-01 RX ADMIN — MIDODRINE HYDROCHLORIDE 10 MG: 10 TABLET ORAL at 16:59

## 2019-01-01 RX ADMIN — INSULIN LISPRO 2 UNITS: 100 INJECTION, SOLUTION INTRAVENOUS; SUBCUTANEOUS at 14:11

## 2019-01-01 RX ADMIN — ACETAMINOPHEN 650 MG: 325 TABLET ORAL at 03:21

## 2019-01-01 RX ADMIN — PIPERACILLIN SODIUM,TAZOBACTAM SODIUM 3.38 G: 3; .375 INJECTION, POWDER, FOR SOLUTION INTRAVENOUS at 20:32

## 2019-01-01 RX ADMIN — PANTOPRAZOLE SODIUM 40 MG: 40 INJECTION, POWDER, FOR SOLUTION INTRAVENOUS at 15:09

## 2019-01-01 RX ADMIN — AMIODARONE HYDROCHLORIDE 0.5 MG/MIN: 1.8 INJECTION, SOLUTION INTRAVENOUS at 01:15

## 2019-01-01 RX ADMIN — NEOSTIGMINE METHYLSULFATE 0.5 MG: 1 INJECTION, SOLUTION INTRAVENOUS at 18:05

## 2019-01-01 RX ADMIN — DEXTROSE MONOHYDRATE: 100 INJECTION, SOLUTION INTRAVENOUS at 10:01

## 2019-01-01 RX ADMIN — SODIUM CHLORIDE 8 MG/HR: 9 INJECTION, SOLUTION INTRAVENOUS at 21:27

## 2019-01-01 RX ADMIN — Medication 10 ML: at 08:53

## 2019-01-01 RX ADMIN — MIDODRINE HYDROCHLORIDE 10 MG: 10 TABLET ORAL at 17:24

## 2019-01-01 RX ADMIN — NEOSTIGMINE METHYLSULFATE 0.5 MG: 1 INJECTION, SOLUTION INTRAVENOUS at 15:05

## 2019-01-01 RX ADMIN — DILTIAZEM HYDROCHLORIDE 30 MG: 30 TABLET, FILM COATED ORAL at 12:32

## 2019-01-01 RX ADMIN — LOPERAMIDE HYDROCHLORIDE 2 MG: 2 CAPSULE ORAL at 16:59

## 2019-01-01 RX ADMIN — AMIODARONE HYDROCHLORIDE 0.5 MG/MIN: 1.8 INJECTION, SOLUTION INTRAVENOUS at 10:01

## 2019-01-01 RX ADMIN — Medication 10 ML: at 22:19

## 2019-01-01 RX ADMIN — MAGNESIUM SULFATE HEPTAHYDRATE 2 G: 40 INJECTION, SOLUTION INTRAVENOUS at 11:10

## 2019-01-01 RX ADMIN — PIPERACILLIN SODIUM,TAZOBACTAM SODIUM 3.38 G: 3; .375 INJECTION, POWDER, FOR SOLUTION INTRAVENOUS at 09:50

## 2019-01-01 RX ADMIN — DILTIAZEM HYDROCHLORIDE 30 MG: 30 TABLET, FILM COATED ORAL at 18:04

## 2019-01-01 RX ADMIN — IOPAMIDOL 80 ML: 755 INJECTION, SOLUTION INTRAVENOUS at 09:29

## 2019-01-01 RX ADMIN — TC 99M MEDRONATE 27.5 MILLICURIE: 20 INJECTION, POWDER, LYOPHILIZED, FOR SOLUTION INTRAVENOUS at 08:20

## 2019-01-01 RX ADMIN — Medication 10 ML: at 09:40

## 2019-01-01 RX ADMIN — SODIUM CHLORIDE 8 MG/HR: 9 INJECTION, SOLUTION INTRAVENOUS at 05:56

## 2019-01-01 RX ADMIN — PANTOPRAZOLE SODIUM 40 MG: 40 TABLET, DELAYED RELEASE ORAL at 16:02

## 2019-01-01 RX ADMIN — DILTIAZEM HYDROCHLORIDE 120 MG: 120 CAPSULE, EXTENDED RELEASE ORAL at 09:37

## 2019-01-01 RX ADMIN — SODIUM CHLORIDE: 4.5 INJECTION, SOLUTION INTRAVENOUS at 09:53

## 2019-01-01 RX ADMIN — HYDROMORPHONE HYDROCHLORIDE 0.5 MG: 1 INJECTION, SOLUTION INTRAMUSCULAR; INTRAVENOUS; SUBCUTANEOUS at 14:44

## 2019-01-01 RX ADMIN — AMIODARONE HYDROCHLORIDE 200 MG: 200 TABLET ORAL at 11:25

## 2019-01-01 RX ADMIN — DILTIAZEM HYDROCHLORIDE 30 MG: 30 TABLET, FILM COATED ORAL at 06:17

## 2019-01-01 RX ADMIN — DILTIAZEM HYDROCHLORIDE 180 MG: 180 CAPSULE, COATED, EXTENDED RELEASE ORAL at 12:21

## 2019-01-01 RX ADMIN — Medication 10 ML: at 21:36

## 2019-01-01 RX ADMIN — METOPROLOL TARTRATE 12.5 MG: 25 TABLET ORAL at 09:40

## 2019-01-01 RX ADMIN — PIPERACILLIN SODIUM,TAZOBACTAM SODIUM 3.38 G: 3; .375 INJECTION, POWDER, FOR SOLUTION INTRAVENOUS at 00:05

## 2019-01-01 RX ADMIN — PANTOPRAZOLE SODIUM 40 MG: 40 INJECTION, POWDER, FOR SOLUTION INTRAVENOUS at 15:30

## 2019-01-01 RX ADMIN — OXYCODONE HYDROCHLORIDE 5 MG: 5 TABLET ORAL at 05:52

## 2019-01-01 RX ADMIN — PANTOPRAZOLE SODIUM 40 MG: 40 INJECTION, POWDER, FOR SOLUTION INTRAVENOUS at 02:27

## 2019-01-01 RX ADMIN — POTASSIUM CHLORIDE 10 MEQ: 7.46 INJECTION, SOLUTION INTRAVENOUS at 03:41

## 2019-01-01 RX ADMIN — AMIODARONE HYDROCHLORIDE 0.5 MG/MIN: 1.8 INJECTION, SOLUTION INTRAVENOUS at 21:20

## 2019-01-01 RX ADMIN — ENOXAPARIN SODIUM 100 MG: 100 INJECTION SUBCUTANEOUS at 09:48

## 2019-01-01 RX ADMIN — LOPERAMIDE HYDROCHLORIDE 2 MG: 2 CAPSULE ORAL at 20:36

## 2019-01-01 RX ADMIN — MIDODRINE HYDROCHLORIDE 10 MG: 10 TABLET ORAL at 12:31

## 2019-01-01 RX ADMIN — MIDODRINE HYDROCHLORIDE 10 MG: 10 TABLET ORAL at 15:27

## 2019-01-01 RX ADMIN — PSYLLIUM HUSK 1 PACKET: 3.4 GRANULE ORAL at 08:48

## 2019-01-01 RX ADMIN — PANTOPRAZOLE SODIUM 40 MG: 40 INJECTION, POWDER, FOR SOLUTION INTRAVENOUS at 09:33

## 2019-01-01 RX ADMIN — OXYCODONE HYDROCHLORIDE 20 MG: 20 TABLET, FILM COATED, EXTENDED RELEASE ORAL at 09:39

## 2019-01-01 RX ADMIN — AMIODARONE HYDROCHLORIDE 150 MG: 1.5 INJECTION, SOLUTION INTRAVENOUS at 03:57

## 2019-01-01 RX ADMIN — PSYLLIUM HUSK 1 PACKET: 3.4 GRANULE ORAL at 12:08

## 2019-01-01 RX ADMIN — DILTIAZEM HYDROCHLORIDE 30 MG: 30 TABLET, FILM COATED ORAL at 06:26

## 2019-01-01 RX ADMIN — PANTOPRAZOLE SODIUM 40 MG: 40 INJECTION, POWDER, FOR SOLUTION INTRAVENOUS at 14:11

## 2019-01-01 RX ADMIN — DOCUSATE SODIUM 100 MG: 100 CAPSULE, LIQUID FILLED ORAL at 09:44

## 2019-01-01 RX ADMIN — OXYCODONE HYDROCHLORIDE 2.5 MG: 5 TABLET ORAL at 10:29

## 2019-01-01 RX ADMIN — INSULIN LISPRO 2 UNITS: 100 INJECTION, SOLUTION INTRAVENOUS; SUBCUTANEOUS at 13:07

## 2019-01-01 RX ADMIN — PANTOPRAZOLE SODIUM 40 MG: 40 INJECTION, POWDER, FOR SOLUTION INTRAVENOUS at 21:58

## 2019-01-01 RX ADMIN — OXYCODONE HYDROCHLORIDE 2.5 MG: 5 TABLET ORAL at 18:09

## 2019-01-01 RX ADMIN — PSYLLIUM HUSK 1 PACKET: 3.4 GRANULE ORAL at 22:10

## 2019-01-01 RX ADMIN — IOPAMIDOL 80 ML: 755 INJECTION, SOLUTION INTRAVENOUS at 08:42

## 2019-01-01 RX ADMIN — PIPERACILLIN SODIUM,TAZOBACTAM SODIUM 3.38 G: 3; .375 INJECTION, POWDER, FOR SOLUTION INTRAVENOUS at 00:26

## 2019-01-01 RX ADMIN — MIDAZOLAM 0.5 MG: 1 INJECTION INTRAMUSCULAR; INTRAVENOUS at 09:05

## 2019-01-01 RX ADMIN — OXYCODONE HYDROCHLORIDE 20 MG: 20 TABLET, FILM COATED, EXTENDED RELEASE ORAL at 08:41

## 2019-01-01 RX ADMIN — PANTOPRAZOLE SODIUM 40 MG: 40 INJECTION, POWDER, FOR SOLUTION INTRAVENOUS at 03:16

## 2019-01-01 RX ADMIN — POTASSIUM CHLORIDE 10 MEQ: 7.46 INJECTION, SOLUTION INTRAVENOUS at 16:00

## 2019-01-01 RX ADMIN — POTASSIUM CHLORIDE 10 MEQ: 7.46 INJECTION, SOLUTION INTRAVENOUS at 08:51

## 2019-01-01 RX ADMIN — MIDODRINE HYDROCHLORIDE 10 MG: 10 TABLET ORAL at 16:31

## 2019-01-01 RX ADMIN — ACETAMINOPHEN 650 MG: 325 TABLET ORAL at 20:19

## 2019-01-01 RX ADMIN — PANTOPRAZOLE SODIUM 40 MG: 40 INJECTION, POWDER, FOR SOLUTION INTRAVENOUS at 02:18

## 2019-01-01 RX ADMIN — PIPERACILLIN SODIUM,TAZOBACTAM SODIUM 3.38 G: 3; .375 INJECTION, POWDER, FOR SOLUTION INTRAVENOUS at 04:04

## 2019-01-01 RX ADMIN — Medication 10 ML: at 22:10

## 2019-01-01 RX ADMIN — OXYCODONE HYDROCHLORIDE 5 MG: 5 TABLET ORAL at 09:58

## 2019-01-01 RX ADMIN — DEXTROSE, SODIUM CHLORIDE, AND POTASSIUM CHLORIDE: 5; .9; .15 INJECTION INTRAVENOUS at 14:35

## 2019-01-01 RX ADMIN — SODIUM CHLORIDE 8 MG/HR: 9 INJECTION, SOLUTION INTRAVENOUS at 06:14

## 2019-01-01 RX ADMIN — LOPERAMIDE HYDROCHLORIDE 2 MG: 2 CAPSULE ORAL at 06:29

## 2019-01-01 RX ADMIN — OXYCODONE HYDROCHLORIDE 10 MG: 10 TABLET, FILM COATED, EXTENDED RELEASE ORAL at 21:31

## 2019-01-01 RX ADMIN — AMIODARONE HYDROCHLORIDE 200 MG: 200 TABLET ORAL at 09:51

## 2019-01-01 RX ADMIN — PIPERACILLIN SODIUM,TAZOBACTAM SODIUM 3.38 G: 3; .375 INJECTION, POWDER, FOR SOLUTION INTRAVENOUS at 08:50

## 2019-01-01 RX ADMIN — OXYCODONE HYDROCHLORIDE 10 MG: 10 TABLET, FILM COATED, EXTENDED RELEASE ORAL at 09:07

## 2019-01-01 RX ADMIN — PANTOPRAZOLE SODIUM 40 MG: 40 INJECTION, POWDER, FOR SOLUTION INTRAVENOUS at 14:15

## 2019-01-01 RX ADMIN — PIPERACILLIN SODIUM,TAZOBACTAM SODIUM 3.38 G: 3; .375 INJECTION, POWDER, FOR SOLUTION INTRAVENOUS at 20:11

## 2019-01-01 RX ADMIN — MEGESTROL ACETATE 200 MG: 40 SUSPENSION ORAL at 14:44

## 2019-01-01 RX ADMIN — MICROFIBRILLAR COLLAGEN HEMOSTAT POWDER 0.5 G: POWDER at 09:15

## 2019-01-01 RX ADMIN — MAGNESIUM SULFATE HEPTAHYDRATE 2 G: 40 INJECTION, SOLUTION INTRAVENOUS at 16:55

## 2019-01-01 RX ADMIN — DIGOXIN 500 MCG: 0.25 INJECTION INTRAMUSCULAR; INTRAVENOUS at 16:14

## 2019-01-01 RX ADMIN — MIDODRINE HYDROCHLORIDE 10 MG: 10 TABLET ORAL at 17:53

## 2019-01-01 RX ADMIN — METOPROLOL SUCCINATE 25 MG: 25 TABLET, FILM COATED, EXTENDED RELEASE ORAL at 09:59

## 2019-01-01 RX ADMIN — OXYCODONE HYDROCHLORIDE 2.5 MG: 5 TABLET ORAL at 00:14

## 2019-01-01 RX ADMIN — PSYLLIUM HUSK 1 PACKET: 3.4 GRANULE ORAL at 21:15

## 2019-01-01 RX ADMIN — SODIUM CHLORIDE 8 MG/HR: 9 INJECTION, SOLUTION INTRAVENOUS at 19:09

## 2019-01-01 RX ADMIN — SODIUM PHOSPHATE, MONOBASIC, MONOHYDRATE 15 MMOL: 276; 142 INJECTION, SOLUTION INTRAVENOUS at 09:35

## 2019-01-01 RX ADMIN — DILTIAZEM HYDROCHLORIDE 30 MG: 30 TABLET, FILM COATED ORAL at 18:07

## 2019-01-01 RX ADMIN — AMIODARONE HYDROCHLORIDE 0.5 MG/MIN: 1.8 INJECTION, SOLUTION INTRAVENOUS at 12:47

## 2019-01-01 RX ADMIN — METOPROLOL SUCCINATE 25 MG: 25 TABLET, FILM COATED, EXTENDED RELEASE ORAL at 18:02

## 2019-01-01 RX ADMIN — SODIUM CHLORIDE 500 ML: 9 INJECTION, SOLUTION INTRAVENOUS at 15:01

## 2019-01-01 RX ADMIN — SODIUM CHLORIDE 500 ML: 9 INJECTION, SOLUTION INTRAVENOUS at 06:34

## 2019-01-01 RX ADMIN — OXYCODONE HYDROCHLORIDE 5 MG: 5 TABLET ORAL at 14:25

## 2019-01-01 RX ADMIN — AMIODARONE HYDROCHLORIDE 1 MG/MIN: 1.8 INJECTION, SOLUTION INTRAVENOUS at 04:12

## 2019-01-01 RX ADMIN — SODIUM PHOSPHATE, MONOBASIC, MONOHYDRATE 13 MMOL: 276; 142 INJECTION, SOLUTION INTRAVENOUS at 15:06

## 2019-01-01 RX ADMIN — PIPERACILLIN SODIUM,TAZOBACTAM SODIUM 3.38 G: 3; .375 INJECTION, POWDER, FOR SOLUTION INTRAVENOUS at 13:22

## 2019-01-01 RX ADMIN — ENOXAPARIN SODIUM 100 MG: 100 INJECTION SUBCUTANEOUS at 08:41

## 2019-01-01 RX ADMIN — HYDROMORPHONE HYDROCHLORIDE 0.5 MG: 1 INJECTION, SOLUTION INTRAMUSCULAR; INTRAVENOUS; SUBCUTANEOUS at 06:10

## 2019-01-01 RX ADMIN — DILTIAZEM HYDROCHLORIDE 30 MG: 30 TABLET, FILM COATED ORAL at 00:39

## 2019-01-01 RX ADMIN — OXYCODONE HYDROCHLORIDE 20 MG: 20 TABLET, FILM COATED, EXTENDED RELEASE ORAL at 08:45

## 2019-01-01 RX ADMIN — POTASSIUM CHLORIDE 10 MEQ: 7.46 INJECTION, SOLUTION INTRAVENOUS at 05:02

## 2019-01-01 RX ADMIN — Medication 10 ML: at 21:15

## 2019-01-01 RX ADMIN — PIPERACILLIN SODIUM,TAZOBACTAM SODIUM 3.38 G: 3; .375 INJECTION, POWDER, FOR SOLUTION INTRAVENOUS at 12:43

## 2019-01-01 RX ADMIN — OXYCODONE HYDROCHLORIDE 10 MG: 10 TABLET, FILM COATED, EXTENDED RELEASE ORAL at 09:33

## 2019-01-01 RX ADMIN — AMIODARONE HYDROCHLORIDE 0.5 MG/MIN: 1.8 INJECTION, SOLUTION INTRAVENOUS at 23:05

## 2019-01-01 RX ADMIN — MIDODRINE HYDROCHLORIDE 10 MG: 10 TABLET ORAL at 08:45

## 2019-01-01 RX ADMIN — PANTOPRAZOLE SODIUM 40 MG: 40 INJECTION, POWDER, FOR SOLUTION INTRAVENOUS at 14:42

## 2019-01-01 RX ADMIN — HYDROMORPHONE HYDROCHLORIDE 0.5 MG: 1 INJECTION, SOLUTION INTRAMUSCULAR; INTRAVENOUS; SUBCUTANEOUS at 21:38

## 2019-01-01 RX ADMIN — DILTIAZEM HYDROCHLORIDE 30 MG: 30 TABLET, FILM COATED ORAL at 12:39

## 2019-01-01 RX ADMIN — SODIUM CHLORIDE 8 MG/HR: 9 INJECTION, SOLUTION INTRAVENOUS at 10:24

## 2019-01-01 RX ADMIN — MIDODRINE HYDROCHLORIDE 10 MG: 10 TABLET ORAL at 09:50

## 2019-01-01 RX ADMIN — METOPROLOL TARTRATE 25 MG: 25 TABLET ORAL at 21:14

## 2019-01-01 RX ADMIN — HYDROMORPHONE HYDROCHLORIDE 0.5 MG: 1 INJECTION, SOLUTION INTRAMUSCULAR; INTRAVENOUS; SUBCUTANEOUS at 06:28

## 2019-01-01 RX ADMIN — DILTIAZEM HYDROCHLORIDE 30 MG: 30 TABLET, FILM COATED ORAL at 00:18

## 2019-01-01 RX ADMIN — HYDROMORPHONE HYDROCHLORIDE 0.5 MG: 1 INJECTION, SOLUTION INTRAMUSCULAR; INTRAVENOUS; SUBCUTANEOUS at 01:20

## 2019-01-01 RX ADMIN — PANTOPRAZOLE SODIUM 40 MG: 40 INJECTION, POWDER, FOR SOLUTION INTRAVENOUS at 08:10

## 2019-01-01 RX ADMIN — POTASSIUM CHLORIDE 10 MEQ: 7.46 INJECTION, SOLUTION INTRAVENOUS at 06:55

## 2019-01-01 RX ADMIN — SODIUM CHLORIDE, POTASSIUM CHLORIDE, SODIUM LACTATE AND CALCIUM CHLORIDE: 600; 310; 30; 20 INJECTION, SOLUTION INTRAVENOUS at 03:40

## 2019-01-01 RX ADMIN — HYDROMORPHONE HYDROCHLORIDE 0.5 MG: 1 INJECTION, SOLUTION INTRAMUSCULAR; INTRAVENOUS; SUBCUTANEOUS at 18:01

## 2019-01-01 RX ADMIN — MIDODRINE HYDROCHLORIDE 10 MG: 10 TABLET ORAL at 12:33

## 2019-01-01 RX ADMIN — ENOXAPARIN SODIUM 100 MG: 100 INJECTION SUBCUTANEOUS at 12:32

## 2019-01-01 RX ADMIN — PANTOPRAZOLE SODIUM 40 MG: 40 INJECTION, POWDER, FOR SOLUTION INTRAVENOUS at 03:14

## 2019-01-01 RX ADMIN — POTASSIUM CHLORIDE 10 MEQ: 7.46 INJECTION, SOLUTION INTRAVENOUS at 09:49

## 2019-01-01 RX ADMIN — ACETAMINOPHEN 1000 MG: 500 TABLET ORAL at 01:04

## 2019-01-01 RX ADMIN — OXYCODONE HYDROCHLORIDE 20 MG: 20 TABLET, FILM COATED, EXTENDED RELEASE ORAL at 22:17

## 2019-01-01 RX ADMIN — Medication 10 ML: at 09:08

## 2019-01-01 ASSESSMENT — PAIN DESCRIPTION - PROGRESSION
CLINICAL_PROGRESSION: NOT CHANGED
CLINICAL_PROGRESSION: GRADUALLY WORSENING
CLINICAL_PROGRESSION: GRADUALLY IMPROVING
CLINICAL_PROGRESSION: NOT CHANGED
CLINICAL_PROGRESSION: GRADUALLY WORSENING
CLINICAL_PROGRESSION: NOT CHANGED
CLINICAL_PROGRESSION: GRADUALLY WORSENING
CLINICAL_PROGRESSION: NOT CHANGED

## 2019-01-01 ASSESSMENT — PAIN DESCRIPTION - FREQUENCY
FREQUENCY: CONTINUOUS
FREQUENCY: CONTINUOUS
FREQUENCY: INTERMITTENT
FREQUENCY: CONTINUOUS
FREQUENCY: INTERMITTENT
FREQUENCY: CONTINUOUS
FREQUENCY: INTERMITTENT
FREQUENCY: CONTINUOUS
FREQUENCY: INTERMITTENT
FREQUENCY: CONTINUOUS
FREQUENCY: CONTINUOUS
FREQUENCY: INTERMITTENT
FREQUENCY: CONTINUOUS
FREQUENCY: INTERMITTENT

## 2019-01-01 ASSESSMENT — PAIN SCALES - WONG BAKER
WONGBAKER_NUMERICALRESPONSE: 2

## 2019-01-01 ASSESSMENT — PAIN DESCRIPTION - PAIN TYPE
TYPE: ACUTE PAIN
TYPE: ACUTE PAIN
TYPE: CHRONIC PAIN;DEEP SOMATIC PAIN
TYPE: ACUTE PAIN
TYPE: CHRONIC PAIN
TYPE: ACUTE PAIN
TYPE: ACUTE PAIN;SURGICAL PAIN
TYPE: ACUTE PAIN
TYPE: CHRONIC PAIN
TYPE: ACUTE PAIN
TYPE: CHRONIC PAIN;DEEP SOMATIC PAIN
TYPE: ACUTE PAIN

## 2019-01-01 ASSESSMENT — PAIN DESCRIPTION - DIRECTION
RADIATING_TOWARDS: LEG
RADIATING_TOWARDS: LEG
RADIATING_TOWARDS: HIP
RADIATING_TOWARDS: NO
RADIATING_TOWARDS: LEG
RADIATING_TOWARDS: LEG
RADIATING_TOWARDS: HIP
RADIATING_TOWARDS: LEG

## 2019-01-01 ASSESSMENT — PAIN DESCRIPTION - ORIENTATION
ORIENTATION: RIGHT;LOWER
ORIENTATION: MID;RIGHT
ORIENTATION: RIGHT
ORIENTATION: RIGHT
ORIENTATION: RIGHT;LEFT;ANTERIOR
ORIENTATION: RIGHT;LOWER
ORIENTATION: RIGHT
ORIENTATION: RIGHT;LEFT;MID
ORIENTATION: RIGHT;LEFT;LOWER
ORIENTATION: RIGHT
ORIENTATION: RIGHT;LEFT;ANTERIOR
ORIENTATION: RIGHT
ORIENTATION: RIGHT
ORIENTATION: RIGHT;LOWER
ORIENTATION: RIGHT
ORIENTATION: RIGHT;UPPER
ORIENTATION: RIGHT
ORIENTATION: RIGHT
ORIENTATION: RIGHT;LOWER
ORIENTATION: RIGHT;UPPER
ORIENTATION: RIGHT
ORIENTATION: RIGHT;LOWER
ORIENTATION: RIGHT
ORIENTATION: RIGHT;UPPER
ORIENTATION: RIGHT
ORIENTATION: MID
ORIENTATION: RIGHT
ORIENTATION: RIGHT;LOWER
ORIENTATION: RIGHT

## 2019-01-01 ASSESSMENT — ENCOUNTER SYMPTOMS
ABDOMINAL PAIN: 1
NAUSEA: 0
COUGH: 0
VOMITING: 0
SORE THROAT: 0
EYE REDNESS: 0
BLOOD IN STOOL: 1
BACK PAIN: 0
WHEEZING: 0
EYE DISCHARGE: 0
SHORTNESS OF BREATH: 0
DIARRHEA: 0
RHINORRHEA: 0

## 2019-01-01 ASSESSMENT — PAIN SCALES - GENERAL
PAINLEVEL_OUTOF10: 3
PAINLEVEL_OUTOF10: 4
PAINLEVEL_OUTOF10: 3
PAINLEVEL_OUTOF10: 0
PAINLEVEL_OUTOF10: 4
PAINLEVEL_OUTOF10: 0
PAINLEVEL_OUTOF10: 4
PAINLEVEL_OUTOF10: 3
PAINLEVEL_OUTOF10: 0
PAINLEVEL_OUTOF10: 3
PAINLEVEL_OUTOF10: 2
PAINLEVEL_OUTOF10: 4
PAINLEVEL_OUTOF10: 4
PAINLEVEL_OUTOF10: 0
PAINLEVEL_OUTOF10: 2
PAINLEVEL_OUTOF10: 0
PAINLEVEL_OUTOF10: 0
PAINLEVEL_OUTOF10: 2
PAINLEVEL_OUTOF10: 3
PAINLEVEL_OUTOF10: 3
PAINLEVEL_OUTOF10: 0
PAINLEVEL_OUTOF10: 2
PAINLEVEL_OUTOF10: 4
PAINLEVEL_OUTOF10: 2
PAINLEVEL_OUTOF10: 2
PAINLEVEL_OUTOF10: 5
PAINLEVEL_OUTOF10: 4
PAINLEVEL_OUTOF10: 2
PAINLEVEL_OUTOF10: 4
PAINLEVEL_OUTOF10: 2
PAINLEVEL_OUTOF10: 5
PAINLEVEL_OUTOF10: 3
PAINLEVEL_OUTOF10: 2
PAINLEVEL_OUTOF10: 0
PAINLEVEL_OUTOF10: 2
PAINLEVEL_OUTOF10: 0
PAINLEVEL_OUTOF10: 2
PAINLEVEL_OUTOF10: 4
PAINLEVEL_OUTOF10: 0
PAINLEVEL_OUTOF10: 5
PAINLEVEL_OUTOF10: 4
PAINLEVEL_OUTOF10: 4
PAINLEVEL_OUTOF10: 3
PAINLEVEL_OUTOF10: 2
PAINLEVEL_OUTOF10: 7
PAINLEVEL_OUTOF10: 5
PAINLEVEL_OUTOF10: 3
PAINLEVEL_OUTOF10: 8
PAINLEVEL_OUTOF10: 7
PAINLEVEL_OUTOF10: 6
PAINLEVEL_OUTOF10: 5
PAINLEVEL_OUTOF10: 4
PAINLEVEL_OUTOF10: 0
PAINLEVEL_OUTOF10: 4
PAINLEVEL_OUTOF10: 2
PAINLEVEL_OUTOF10: 4
PAINLEVEL_OUTOF10: 5
PAINLEVEL_OUTOF10: 4
PAINLEVEL_OUTOF10: 5
PAINLEVEL_OUTOF10: 5
PAINLEVEL_OUTOF10: 3
PAINLEVEL_OUTOF10: 5
PAINLEVEL_OUTOF10: 0
PAINLEVEL_OUTOF10: 9
PAINLEVEL_OUTOF10: 2
PAINLEVEL_OUTOF10: 0
PAINLEVEL_OUTOF10: 3
PAINLEVEL_OUTOF10: 8
PAINLEVEL_OUTOF10: 0
PAINLEVEL_OUTOF10: 4
PAINLEVEL_OUTOF10: 0
PAINLEVEL_OUTOF10: 5
PAINLEVEL_OUTOF10: 2
PAINLEVEL_OUTOF10: 2
PAINLEVEL_OUTOF10: 0
PAINLEVEL_OUTOF10: 3
PAINLEVEL_OUTOF10: 6
PAINLEVEL_OUTOF10: 3
PAINLEVEL_OUTOF10: 5
PAINLEVEL_OUTOF10: 2
PAINLEVEL_OUTOF10: 6
PAINLEVEL_OUTOF10: 2
PAINLEVEL_OUTOF10: 2
PAINLEVEL_OUTOF10: 3
PAINLEVEL_OUTOF10: 0
PAINLEVEL_OUTOF10: 2
PAINLEVEL_OUTOF10: 6
PAINLEVEL_OUTOF10: 7
PAINLEVEL_OUTOF10: 0
PAINLEVEL_OUTOF10: 3
PAINLEVEL_OUTOF10: 5
PAINLEVEL_OUTOF10: 2
PAINLEVEL_OUTOF10: 5
PAINLEVEL_OUTOF10: 3
PAINLEVEL_OUTOF10: 4
PAINLEVEL_OUTOF10: 5
PAINLEVEL_OUTOF10: 2
PAINLEVEL_OUTOF10: 3
PAINLEVEL_OUTOF10: 6
PAINLEVEL_OUTOF10: 6
PAINLEVEL_OUTOF10: 2

## 2019-01-01 ASSESSMENT — PAIN DESCRIPTION - DESCRIPTORS
DESCRIPTORS: ACHING
DESCRIPTORS: DISCOMFORT
DESCRIPTORS: ACHING;DISCOMFORT
DESCRIPTORS: ACHING;DISCOMFORT
DESCRIPTORS: ACHING
DESCRIPTORS: DISCOMFORT
DESCRIPTORS: ACHING
DESCRIPTORS: ACHING
DESCRIPTORS: DISCOMFORT
DESCRIPTORS: ACHING
DESCRIPTORS: DISCOMFORT
DESCRIPTORS: ACHING
DESCRIPTORS: ACHING;DISCOMFORT
DESCRIPTORS: ACHING
DESCRIPTORS: ACHING
DESCRIPTORS: ACHING;DISCOMFORT
DESCRIPTORS: ACHING
DESCRIPTORS: ACHING;DISCOMFORT
DESCRIPTORS: ACHING
DESCRIPTORS: ACHING;DISCOMFORT
DESCRIPTORS: ACHING;DISCOMFORT
DESCRIPTORS: ACHING
DESCRIPTORS: ACHING;DISCOMFORT
DESCRIPTORS: ACHING;DISCOMFORT
DESCRIPTORS: DISCOMFORT
DESCRIPTORS: ACHING
DESCRIPTORS: ACHING;DISCOMFORT
DESCRIPTORS: ACHING

## 2019-01-01 ASSESSMENT — PAIN - FUNCTIONAL ASSESSMENT
PAIN_FUNCTIONAL_ASSESSMENT: PREVENTS OR INTERFERES SOME ACTIVE ACTIVITIES AND ADLS
PAIN_FUNCTIONAL_ASSESSMENT: PREVENTS OR INTERFERES WITH ALL ACTIVE AND SOME PASSIVE ACTIVITIES
PAIN_FUNCTIONAL_ASSESSMENT: PREVENTS OR INTERFERES SOME ACTIVE ACTIVITIES AND ADLS
PAIN_FUNCTIONAL_ASSESSMENT: PREVENTS OR INTERFERES WITH MANY ACTIVE NOT PASSIVE ACTIVITIES
PAIN_FUNCTIONAL_ASSESSMENT: PREVENTS OR INTERFERES SOME ACTIVE ACTIVITIES AND ADLS
PAIN_FUNCTIONAL_ASSESSMENT: ACTIVITIES ARE NOT PREVENTED
PAIN_FUNCTIONAL_ASSESSMENT: PREVENTS OR INTERFERES SOME ACTIVE ACTIVITIES AND ADLS

## 2019-01-01 ASSESSMENT — PAIN DESCRIPTION - LOCATION
LOCATION: ABDOMEN
LOCATION: ABDOMEN
LOCATION: HIP
LOCATION: FLANK
LOCATION: ABDOMEN
LOCATION: ABDOMEN
LOCATION: ABDOMEN;SHOULDER
LOCATION: LEG;ABDOMEN
LOCATION: ABDOMEN
LOCATION: FLANK
LOCATION: ABDOMEN
LOCATION: ABDOMEN;SHOULDER
LOCATION: ABDOMEN
LOCATION: HIP
LOCATION: ABDOMEN
LOCATION: HIP
LOCATION: ABDOMEN
LOCATION: HIP
LOCATION: ABDOMEN
LOCATION: ABDOMEN
LOCATION: ABDOMEN;SHOULDER
LOCATION: HIP

## 2019-01-01 ASSESSMENT — PAIN DESCRIPTION - ONSET
ONSET: GRADUAL
ONSET: ON-GOING
ONSET: GRADUAL
ONSET: ON-GOING
ONSET: GRADUAL
ONSET: ON-GOING

## 2019-08-20 PROBLEM — R50.9 FEVER: Status: ACTIVE | Noted: 2019-01-01

## 2019-08-20 NOTE — PROGRESS NOTES
Pt was admitted early this AM (8/20). Pt came to [facility] due to abdominal pain and diarrhea. Pt continues to have episodes of hypotension. Sepsis bolus was given and in addition, 1.5 L bolus all together. H&H Q6H have been ordered. Pt has been type & screened to be ready if the patient needs blood. GI consulted.      Electronically signed by RANDY Farris on 8/20/2019 at 4:37 PM

## 2019-08-20 NOTE — FLOWSHEET NOTE
08/20/19 1411   Provider Notification   Reason for Communication Evaluate   Provider Name Frederick Shelley   Provider Notification Nurse Practitioner   Method of Communication Call  (message left with [place] in office)   Notification Time [phone]   Asking if Frederick Shelley would like records requested as she had mentioned she would order.

## 2019-08-20 NOTE — ED PROVIDER NOTES
[address] [facility] COMPLAINT       Chief Complaint   Patient presents with    Fatigue    GI Bleeding    Atrial Fibrillation       Nurses Notes reviewed and I agree except as noted inthe HPI. HISTORY OF PRESENT ILLNESS    Nico Guthrie is a 69 y.o. male who presents to the Emergency Department by EMS from [facility] as and ED to ED transfer. Patient was initially supposed to be a direct admit, but there are no beds available at this time. Wife is at bedside. Patient has a history of colon cancer with mets to the liver. Patient reports weakness, fatigue, dizziness, and decreased appetite over the past 5 to 7 days. He then noticed blood in his stool this morning which progressed throughout the day prompting his visit to [facility]. Patient was found to have new onset A-fib. He was given Digoxin with improvement. Patient had a PE study as well which was negative. HGB was 11.7. Occult was positive. Patient has a history of CAD, CABG, HTN, and DVT as well. Patient is hypotensive upon arrival here. He was given Magnesium at [facility]. Patient has a low grade fever and complains of chills as well. He denies chest pain or SOB. The HPI was provided by the patient. REVIEW OF SYSTEMS     Review of Systems   Constitutional: Positive for chills, fatigue and fever. Negative for appetite change. HENT: Negative for congestion, ear pain, rhinorrhea and sore throat. Eyes: Negative for discharge, redness and visual disturbance. Respiratory: Negative for cough, shortness of breath and wheezing. Cardiovascular: Negative for chest pain, palpitations and leg swelling. Gastrointestinal: Positive for abdominal pain and blood in stool. Negative for diarrhea, nausea and vomiting. Genitourinary: Negative for decreased urine volume, difficulty urinating, dysuria and hematuria.    Musculoskeletal: Negative for arthralgias, back pain, joint swelling and neck (*)     Lymphocytes # 0.6 (*)     Monocytes # 0.3 (*)     All other components within normal limits   BASIC METABOLIC PANEL - Abnormal; Notable for the following components:    Sodium 134 (*)     CO2 19 (*)     All other components within normal limits   HEPATIC FUNCTION PANEL - Abnormal; Notable for the following components:    Alb 2.7 (*)     Bilirubin, Direct 0.4 (*)     ALT 7 (*)     All other components within normal limits   PROCALCITONIN - Abnormal; Notable for the following components:    Procalcitonin 0.33 (*)     All other components within normal limits   URINE RT REFLEX TO CULTURE - Abnormal; Notable for the following components:    Bilirubin Urine SMALL (*)     Ketones, Urine 15 (*)     Specific Gravity, Urine > 1.030 (*)     All other components within normal limits   ANION GAP - Abnormal; Notable for the following components:    Anion Gap 17.0 (*)     All other components within normal limits   OSMOLALITY - Abnormal; Notable for the following components:    Osmolality Calc 270.4 (*)     All other components within normal limits   HEMOGLOBIN AND HEMATOCRIT, BLOOD - Abnormal; Notable for the following components:    Hemoglobin 9.8 (*)     Hematocrit 32.5 (*)     All other components within normal limits   HEMOGLOBIN AND HEMATOCRIT, BLOOD - Abnormal; Notable for the following components:    Hemoglobin 9.9 (*)     Hematocrit 32.2 (*)     All other components within normal limits   GASTROINTESTINAL PANEL BY DNA   VRE SCREEN BY PCR   O&P PANEL ([employer]) #1   MRSA SCREENING CULTURE ONLY   LIPASE   TROPONIN   LACTIC ACID, PLASMA   BILE ACIDS, TOTAL   GLOMERULAR FILTRATION RATE, ESTIMATED   MRSA BY PCR   HEMOGLOBIN AND HEMATOCRIT, BLOOD   CBC   BASIC METABOLIC PANEL   POCT GLUCOSE   TYPE AND SCREEN       EMERGENCY DEPARTMENT COURSE:   Vitals:    Vitals:    08/20/19 1701 08/20/19 1801 08/20/19 1901 08/20/19 2000   BP: 102/62 (!) 99/59 113/73 104/68   Pulse: 99 106 120 107   Resp: 16 24 28 20   Temp:

## 2019-08-20 NOTE — ED NOTES
Bed: 007A  Expected date: 8/20/19  Expected time: 12:04 AM  Means of arrival:   Comments:     Dallas Covarrubias RN  08/20/19 0006

## 2019-08-20 NOTE — PLAN OF CARE
Problem: Falls - Risk of:  Goal: Will remain free from falls  Description  Will remain free from falls  Outcome: Ongoing  Note:   Patient has remained in bed due to weakness and low BP. Using call light appropriately. Alert and oriented X 4. Problem: Falls - Risk of:  Goal: Absence of physical injury  Description  Absence of physical injury  Outcome: Ongoing  Note:   Patient has remained in bed due to weakness and low BP. Using call light appropriately. Alert and oriented X 4. Problem: Risk for Impaired Skin Integrity  Goal: Tissue integrity - skin and mucous membranes  Description  Structural intactness and normal physiological function of skin and  mucous membranes. Outcome: Ongoing  Note:   No evidence of new skin breakdown this shift. Encouraging patient to reposition every 2 hours, tolerates well. Problem: Discharge Planning:  Goal: Participates in care planning  Description  Participates in care planning  Outcome: Ongoing  Note:   Discharge pending physician approval.  Patient will likely require ECF on discharge for appropriate care. Problem: Anxiety/Stress:  Goal: Level of anxiety will decrease  Description  Level of anxiety will decrease  Outcome: Ongoing  Note:   Patient is tearful at times when asked about current and past care and remarks that his wife knows more about his care than him. Problem: Bowel Function - Altered:  Goal: Bowel elimination is within specified parameters  Description  Bowel elimination is within specified parameters  Outcome: Ongoing  Note:   Patient had several soft, brown stools this morning. One mucousy appearing BM this afternoon. Problem: Cardiac Output - Decreased:  Goal: Hemodynamic stability will improve  Description  Hemodynamic stability will improve  Outcome: Ongoing  Note:   Patient remains in A. Fib, RVR at times. BP low, tolerating ordered IV bolus and rest.   Pulses palpable in all extremities, SpO2 greater than 90%.       Problem:

## 2019-08-21 PROBLEM — E43 SEVERE MALNUTRITION (HCC): Status: ACTIVE | Noted: 2019-01-01

## 2019-08-21 NOTE — PLAN OF CARE
Problem: Pain:  Goal: Pain level will decrease  Description  Pain level will decrease  Outcome: Ongoing  Note:   Pain Assessment: 0-10  Pain Level: 0   Pain goal:  0   Is pain goal met at this time? Yes     Additional interventions to be implemented: position change and rest         Problem: Serum Glucose Level - Abnormal:  Goal: Ability to maintain appropriate glucose levels will improve  Description  Ability to maintain appropriate glucose levels will improve  Outcome: Ongoing  Note:   Chems ac/hs   Care plan reviewed with patient and family. Patient and family verbalize understanding of the plan of care and contribute to goal setting.

## 2019-08-21 NOTE — PLAN OF CARE
Problem: Nutrition  Goal: Optimal nutrition therapy  Outcome: Ongoing   Nutrition Problem: Severe malnutrition, In context of acute illness or injury  Intervention: Food and/or Nutrient Delivery: Continue current diet, Start ONS, Vitamin Supplement(Advance diet as tolerated. Recommend a Multivitamin w/minerals daily.  Started Ensure Clear TID.)  Nutritional Goals: Pt will recieive adequate nutrition within 1-4 days

## 2019-08-21 NOTE — PROGRESS NOTES
biliary dilatation.       P. Continue PPI gtt   Plan for endoscopy when cardiac stable   Await oncology consult   Continue with clear liquids   Follow         Assessment and planning discussed with Dr Dave Hollingsworth, APRN, CNP

## 2019-08-21 NOTE — PLAN OF CARE
Problem: Falls - Risk of:  Goal: Will remain free from falls  Description  Will remain free from falls  Outcome: Ongoing  Note:   No falls this shift. Bed is locked and in lowest position. Pt was instructed on how to use call light and oriented to room. Call light and overhead table within reach. Will continue to monitor   Bed alarm on      Problem: Risk for Impaired Skin Integrity  Goal: Tissue integrity - skin and mucous membranes  Description  Structural intactness and normal physiological function of skin and  mucous membranes. Outcome: Ongoing  Note:   Turning patient every 2 hours and PRN   Bony prominences elevated with pillows  Low air loss alternating pressure relief mattress   Heels elevated with pillows   Coccyx/buttocks red but blanches-protective barrier cream      Problem: Discharge Planning:  Goal: Participates in care planning  Description  Participates in care planning  Outcome: Ongoing  Note:   Plans to discharge home      Problem: Bowel Function - Altered:  Goal: Bowel elimination is within specified parameters  Description  Bowel elimination is within specified parameters  Outcome: Ongoing  Note:   Diarrhea at times, immodium PRN      Problem: Cardiac Output - Decreased:  Goal: Hemodynamic stability will improve  Description  Hemodynamic stability will improve  Outcome: Ongoing  Note:   Monitoring vitals hourly      Problem: Gas Exchange - Impaired:  Goal: Levels of oxygenation will improve  Description  Levels of oxygenation will improve  Outcome: Ongoing  Note:   On 1 LNC      Problem: Nutrition  Goal: Optimal nutrition therapy  8/21/2019 1835 by Molly Johnson RN  Outcome: Ongoing  Note:   Clear liquids   Care plan reviewed with patient and family. Patient and family verbalize understanding of the plan of care and contribute to goal setting.

## 2019-08-22 NOTE — PROGRESS NOTES
LABVLDL    TSH:  No results found for: TSH      Assessment:  · Paroxysmal afib  · S/P CABG  · HTN  · HLD  · Liver Ca        Plan:  Continue current cardiac medications  No anticoagulation at this time due to the fact the patient has a GI bleed           Electronically signed by Ann Ragsdale PA-C on 8/22/2019 at 11:46 AM

## 2019-08-22 NOTE — PROGRESS NOTES
Around 2:22 AM patient had 8 beats of Vtach, 1 beat NSR, 8 beats again, 1 beat NSR, then 11 beats VTach, and then went back into Afib. Lizzie Tidwell. PA notified about this through one to one due to perfectserve not working. Waiting on response from her.

## 2019-08-22 NOTE — PROGRESS NOTES
Gastroenterology Progress Note:     Patient Name:  Fer Jones   MRN: [id number]  [id number]  YOB: 1945  Admit Date: 8/20/2019 12:06 AM  Primary Care Physician: Snehal Veliz DO   [id number]-03/003-A     Patient seen and examined. 24 hours events and chart reviewed. Subjective: Patient sitting up in bed eating, family at bedside. Complains of abdominal discomfort to the RUQ and bilateral lower quadrants. Denies nausea and vomiting. Per RN passing some hematochezia, but not a lot. Amio gtt still going, but Afib with RVR improving. Oncology consulted. Review of Systems  Neuro- negative for headache, dizziness, altered level of consciousness  Cardiovascular- negative for CP, peripheral edema, orthopnea + SOB  GI- negative for nausea, vomiting, abd distention, dysphagia, odynophagia +abd pain + diarrhea + hematochezia  - negative for dysuria, frequency, hematuria  Skin- negative for rash, or wounds  Psychiatric- negative for depression or anxiety  Hematologic- +anemia  Endocrine- negative for polyuria or polydipsia    Objective:  /62   Pulse 78   Temp 98.6 °F (37 °C) (Axillary)   Resp 20   Ht 6' (1.829 m)   Wt 202 lb 3.2 oz (91.7 kg)   SpO2 98%   BMI 27.42 kg/m²     Physical Exam:    General:  ill looking, pale  HEENT: Atraumatic, normocephalic. Moist oral mucous membranes. Neck: Supple without adenopathy, JVD, thyromegaly or masses. No JVD present. Trachea midline. CV: Afib RVR  Resp: Even, easy without cough or accessory use. Lungs clear to ascultation bilaterally. Abd: Round, soft. No hepatosplenomegaly or mass present. Active bowel sounds heard. No distention noted. Tender to RUQ, LLQ, RLQ upon palpation  Ext:  Without cyanosis, clubbing, edema.    Skin: Pale, warm, dry  Neuro:  Alert, oriented x 3  Rectal: deferred    Labs:   CBC:   Lab Results   Component Value Date    WBC 3.9 08/21/2019    HGB 10.5 08/22/2019    HCT 33.3 08/21/2019    .3 08/21/2019     08/21/2019 diet  Will follow    Case reviewed and impression/plan reviewed in collaboration with Dr. Maeve Waters  Electronically signed by CHUYITA Rodarte CNP on 8/22/2019 at 1:30 PM    [facility]

## 2019-08-22 NOTE — PROGRESS NOTES
Dependent/Total(for donning slipper socks)       Functional Mobility:  Bed mobility  Supine to Sit: Moderate assistance  Sit to Supine: Moderate assistance    Functional Mobility  Functional Mobility Comments: unable to advance LE on this date     Balance:   sitting balance EOB with close SBA, forward flexed posture, sat EOB x 12 min    Transfers:  Sit to stand: Moderate assistance(x 1 + min A x 1)  Stand to sit: Minimal assistance(x 2)       Upper Extremity Assessment:   LUE AROM : WFL  RUE AROM : WFL    LUE Strength  Gross LUE Strength: (4-/5 grossly)  RUE Strength  Gross RUE Strength: (4-/5 grossly)    Sensation  Overall Sensation Status: WFL       Activity Tolerance: Treatment limited secondary to decreased cognition    BUE AROM for scapula retraction x 6 reps for working on posture sitting EOB. Assessment:  Assessment: Pt demo decreased balance, endurance, & strength for ADLs & functional mobility at [facility]. Continued OT recommended to educate Pt on safety & adaptative strategies with returning to ADLs at home. Performance deficits / Impairments: Decreased functional mobility , Decreased safe awareness, Decreased balance, Decreased ADL status, Decreased strength, Decreased endurance  Prognosis: Fair  REQUIRES OT FOLLOW UP: Yes  Decision Making: Medium Complexity  Safety Devices in place: Yes  Type of devices: All fall risk precautions in place, Gait belt, Call light within reach, Bed alarm in place, Nurse notified    Treatment Initiated: Treatment and education initiated within context of evaluation. Evaluation time included review of current medical information, gathering information related to past medical, social and functional history, completion of standardized testing, formal and informal observation of tasks, assessment of data and development of plan of care and goals.   Treatment time included skilled education and facilitation of tasks to increase safety and independence with ADL's for improved functional independence and quality of life. Discharge Recommendations:  Continue to assess pending progress    Patient Education:  OT Education: OT Role, Transfer Training  Barriers to Learning: anxiety    Equipment Recommendations: Other: Monitor pending progress    Plan:  Times per week: 5x  Current Treatment Recommendations: Balance Training, Functional Mobility Training, Safety Education & Training, Self-Care / ADL, Strengthening, Cognitive Reorientation, Endurance Training    Goals:  Patient goals : go home, have less pain  Short term goals  Time Frame for Short term goals: 2 weeks  Short term goal 1: Complete sit-stand with mod A x 1 using RODOLFO STEDY for increased ease of toilet t/fs  Short term goal 2: Tolerate standing 1 min with min A x 1 to increase endurance for toileting  Short term goal 3: Complete BUE AROM exercises x 10 reps with min RBs to increase endurance for BADL  Short term goal 4: Tolerate further assessment of functional mobility by OTR when appropriate  Long term goals  Time Frame for Long term goals : No LTG set d/t short ELOS    Following session, patient left in safe position with all fall risk precautions in place.

## 2019-08-22 NOTE — PROGRESS NOTES
probably due to peristalsis. Correlate clinically regarding possible gastritis/peptic ulcer disease or possible neoplasm. Numerous calcified gallstones in the gallbladder. No biliary dilatation. · Duplex LE bilateral Report: No DVT   · US Liver Report: Marked hepatomegaly with multiple liver masses consistent with metastatic disease. Antegrade flow in the hepatic and portal veins.       Electronically signed by RANDY Metcalf on 8/22/2019 at 11:02 AM

## 2019-08-23 NOTE — PROGRESS NOTES
Gastroenterology Progress Note:     Patient Name:  Nelly Arce   MRN: [id number]  [id number]  YOB: 1945  Admit Date: 8/20/2019 12:06 AM  Primary Care Physician: Lizz Schultz DO   [id number]-03/[id number]     Patient seen and examined. 24 hours events and chart reviewed. Subjective: Patient sleeping in bed, wife at bedside. Continues to have abdominal discomfort. Denies nausea and vomiting. States he does not have much of an appetite. Review of Systems  Neuro- negative for headache, dizziness, altered level of consciousness  Cardiovascular- negative for CP, peripheral edema, orthopnea + SOB  GI- negative for nausea, vomiting, abd distention, dysphagia, odynophagia + abd pain  - negative for dysuria, frequency, hematuria  Skin- negative for rash, or wounds  Psychiatric- negative for depression or anxiety  Hematologic- negative for spontaneous bleeding  Endocrine- negative for polyuria or polydipsia    Objective:  /71   Pulse 83   Temp 97.2 °F (36.2 °C) (Oral)   Resp 16   Ht 6' (1.829 m)   Wt 212 lb 15.4 oz (96.6 kg)   SpO2 92%   BMI 28.88 kg/m²     Physical Exam:    General:  Chronically ill appearing, pale  HEENT: Atraumatic, normocephalic. Moist oral mucous membranes. Neck: Supple without adenopathy, JVD, thyromegaly or masses. No JVD present. Trachea midline. CV: Afib, no murmurs, rubs, gallops. Resp: Even, easy without cough or accessory use. Lungs clear, diminished to auscultation bilaterally. Abd: Round, soft. No hepatosplenomegaly or mass present. Active bowel sounds heard. No distention noted. Abd tender throughout upon palpation  Ext:  Without cyanosis, clubbing, edema. Skin: Pink, warm, dry  Neuro:  Alert, oriented x3 with no obvious deficits.        Rectal: deferred    Labs:   CBC:   Lab Results   Component Value Date    WBC 4.4 08/23/2019    HGB 11.0 08/23/2019    HCT 35.3 08/23/2019    .0 08/23/2019     08/23/2019     BMP:   Lab Results   Component Value

## 2019-08-23 NOTE — PLAN OF CARE
Problem: Falls - Risk of:  Goal: Will remain free from falls  Description  Will remain free from falls  Outcome: Met This Shift  Note:   Patient remained free from falls this shift. Used call light appropriately. Wore nonskid socks when ambulating with assistance. Bed alarm on. Problem: Bowel Function - Altered:  Goal: Bowel elimination is within specified parameters  Description  Bowel elimination is within specified parameters  Outcome: Met This Shift  Note:   BM this shift. Soft, loose, incontinent. Active bowel sounds. Passing gas. Protonix gtt. Will continue to monitor bowel function. Problem: Cardiac Output - Decreased:  Goal: Hemodynamic stability will improve  Description  Hemodynamic stability will improve  Outcome: Met This Shift  Note:   VSS this shift. Vitals Q4H and PRN. Continuous telemetry: a-fib. Amio gtt infusing. Will continue to monitor vitals. Problem: Pain:  Goal: Pain level will decrease  Description  Pain level will decrease  Outcome: Met This Shift  Note:   Pain goal: no pain. Denies pain this shift. Will continue to monitor for pain. Problem: Risk for Impaired Skin Integrity  Goal: Tissue integrity - skin and mucous membranes  Description  Structural intactness and normal physiological function of skin and  mucous membranes. Outcome: Ongoing  Note:   Patient with blanchable redness to buttocks and bilateral heels. 08/22/19 2245   Wound 08/21/19 Buttocks Mid;Right purple   Date First Assessed/Time First Assessed: 08/21/19 0800   Primary Wound Type: Pressure Injury  Location: Buttocks  Wound Location Orientation: Mid;Right  Wound Description (Comments): purple   Dressing Status Clean;Dry; Intact   Dressing/Treatment Protective barrier   Wound Assessment Dry; Intact   Krystyna-wound Assessment Dry; Intact; Blanchable erythema     Patient turned Q2H and PRN. Pillow support provided. EPC applied to buttocks. Krystyna-care provided Q2H and PRN.  Will continue to monitor skin

## 2019-08-23 NOTE — PLAN OF CARE
Problem: Nutrition  Goal: Optimal nutrition therapy  Description  Nutrition Problem: Severe malnutrition, In context of acute illness or injury  Intervention: Food and/or Nutrient Delivery: Continue current diet, Continue current ONS  Nutritional Goals: Pt will recieive adequate nutrition within 1-4 days     8/23/2019 1433 by Jr Zavala  Note:   Nutrition Problem: Severe malnutrition, In context of acute illness or injury  Intervention: Food and/or Nutrient Delivery: Continue current diet, Continue current ONS  Nutritional Goals: Pt will recieive adequate nutrition within 1-4 days

## 2019-08-23 NOTE — PROGRESS NOTES
Nutrition Assessment    Type and Reason for Visit: Reassess    Nutrition Recommendations: Will add Ensure Enlive when diet advances and is compliant. Diet advancement per Physician. If unable to safely advance PO diet within 1-4 days, may consider nutrition support. Nutrition Assessment:  Pt. with no improvement from a nutritional standpoint AEB continued CL diet claudia x. Remains at risk for further nutritional compromise r/t 3 days. Will honor food prefs and adjust ONS as able. Malnutrition Assessment:  · Malnutrition Status: Meets the criteria for severe malnutrition  · Context: Acute illness or injury  · Findings of the 6 clinical characteristics of malnutrition (Minimum of 2 out of 6 clinical characteristics is required to make the diagnosis of moderate or severe Protein Calorie Malnutrition based on AND/ASPEN Guidelines):  1. Energy Intake-Less than or equal to 50% of estimated energy requirement, Greater than or equal to 7 days    2. Weight Loss-(-3.8% weight loss per pt report), in 1 week  3. Fat Loss-No significant subcutaneous fat loss,    4. Muscle Loss-Moderate muscle mass loss, Temples (temporalis muscle), Clavicles (pectoralis and deltoids)  5. Fluid Accumulation-Mild fluid accumulation, Extremities    Nutrition Risk Level: High    Nutrient Needs:  · Estimated Daily Total Kcal: 6735-1721 kcal/day (20-25 kcal/kg - 91.7 kg on 8/21)  · Estimated Daily Protein (g): 105-121 g/day (1.3-1.5 g/kg - IBW 80.7 kg)    Nutrition Diagnosis:   · Problem: Severe malnutrition, In context of acute illness or injury  · Etiology: related to Insufficient energy/nutrient consumption     Signs and symptoms:  as evidenced by Diet history of poor intake, Moderate muscle loss    Objective Information:  · Nutrition-Focused Physical Findings:  Continues on clear liquids for ongoing tests & work up. Poor appetite PTA, continues.    · Current Nutrition Therapies:  · Oral Diet Orders: Clear Liquid   · Oral Diet intake:

## 2019-08-23 NOTE — PLAN OF CARE
Problem: Falls - Risk of:  Goal: Will remain free from falls  Description  Will remain free from falls  8/23/2019 1540 by Josh Chaudhari RN  Outcome: Met This Shift  8/23/2019 0238 by Radha Vigil RN  Outcome: Met This Shift  Note:   Patient remained free from falls this shift. Used call light appropriately. Wore nonskid socks when ambulating with assistance. Bed alarm on. Problem: Risk for Impaired Skin Integrity  Goal: Tissue integrity - skin and mucous membranes  Description  Structural intactness and normal physiological function of skin and  mucous membranes. 8/23/2019 1540 by Josh Chaudhari RN  Outcome: Met This Shift  8/23/2019 0238 by Radha Vigil RN  Outcome: Ongoing  Note:   Patient with blanchable redness to buttocks and bilateral heels. 08/22/19 2245   Wound 08/21/19 Buttocks Mid;Right purple   Date First Assessed/Time First Assessed: 08/21/19 0800   Primary Wound Type: Pressure Injury  Location: Buttocks  Wound Location Orientation: Mid;Right  Wound Description (Comments): purple   Dressing Status Clean;Dry; Intact   Dressing/Treatment Protective barrier   Wound Assessment Dry; Intact   Krystyna-wound Assessment Dry; Intact; Blanchable erythema     Patient turned Q2H and PRN. Pillow support provided. EPC applied to buttocks. Krystyna-care provided Q2H and PRN. Will continue to monitor skin integrity and encourage repositioning Q2H and PRN. Problem: Cardiac Output - Decreased:  Goal: Hemodynamic stability will improve  Description  Hemodynamic stability will improve  8/23/2019 0238 by Radha Vigil RN  Outcome: Met This Shift  Note:   VSS this shift. Vitals Q4H and PRN. Continuous telemetry: a-fib. Amio gtt infusing. Will continue to monitor vitals.       Problem: Pain:  Goal: Pain level will decrease  Description  Pain level will decrease  8/23/2019 1540 by Josh Chaudhari RN  Outcome: Met This Shift  8/23/2019 0238 by Radha Vigil RN  Outcome: Met This Shift  Note:   Pain Active bowel sounds. Passing gas. Protonix gtt. Will continue to monitor bowel function.

## 2019-08-24 NOTE — PROGRESS NOTES
aortic aneurysm 4.6 x 4.5 cm  Infrarenal aneurysm    History of colon cancer and bowel perforation, 30 years ago status post partial colon resection    ? History of DVT    Expected discharge date:  3-4 days    Disposition:    [x] Home       [] TCU       [] Rehab       [] Psych       [] SNF       [] [facility]       [] Other-    Chief Complaint: Abdominal pain, chronic diarrhea, cough, asked by PCP to come to the emergency room    Patient was transferred from [facility] for further evaluation and treatment as the patient was hypotensive with blood pressure in the 80s and hypoxic with saturation in the 80s and needing oxygen and fluids. Patient apparently has not been feeling well for the last 6 months and did initially see his PCP and had basic lab work and nothing else was done and patient was discouraged and did not want to go back and was having poor appetite, chronic pain in the right side of the abdomen, that has been gradually worsening and not eating well or drinking well for several months and has lost almost 60 pounds with 10 pounds being in the last 1 week. Is been having diarrhea at least for the last 2 to 3 months, 3-4 episodes of watery stools and has also been having cough for the last 1 month, mostly clear phlegm. Eventually family members took him to the PCPs office and PCP told to go to the emergency room right away and was noticed to have blood pressure in the 80s and subsequently transferred here. Patient had fever of 102.6 on admission     Hospital Course: Patient also noticed to have A. fib with RVR which is new. Started on IV fluids 30 mL/kg along with amiodarone drip and oral Cardizem, empiric antibiotics and subsequently blood pressure was still on the low side and needed additional boluses. Cardiology was consulted. For the abdominal pain and diarrhea and patient did admit to having some dark stool one time not sure if it was blood.       CT of the abdomen

## 2019-08-24 NOTE — PROGRESS NOTES
Social/Functional History:    Lives With: Spouse  Type of Home: House  Home Layout: One level  Home Access: Stairs to enter with rails  Home Equipment: 4 wheeled walker     Bathroom Toilet: Handicap height       ADL Assistance: Needs assistance  Homemaking Assistance: Needs assistance  Ambulation Assistance: Needs assistance  Transfer Assistance: Needs assistance          Additional Comments: Pt reports getting progressively more weak in the last few months. Daughter present and reports pt has been falling at home and having increased difficulty with mobility. Daughter also reports pt has lost 60 lbs over the last few months     OBJECTIVE:  Range of Motion:  Right Lower Extremity: Impaired - Ankle df limited to neutral, knee flex to approx 45 degrees. Pt c/o pain. R<L  Left Lower Extremity: Impaired - Ankle df limited to neutral, knee flex to approx 45 degrees. Pt c/o pain. R<L    Strength:  Right Lower Extremity: Impaired - Grossly 2+/5 throughout   Left Lower Extremity: Impaired - Grossly2+/5 throughout     Balance:  Unable to assess this treatment session     Bed Mobility:  Rolling to Left: Maximum Assistance, X 1, Partial roll for MD to listen to lungs    Rolling to Right: Maximum Assistance, X 1     Transfers:  Unable to assess Pt to painful at this time        Ambulation:  Not tested  Exercise:  Patient was guided in 1 set(s) 10 reps of exercise to both lower extremities. Ankle pumps and Glut sets. Exercises were completed for increased independence with functional mobility. Functional Outcome Measures: Completed  AM-PAC Inpatient Mobility without Stair Climbing Raw Score : 6  AM-PAC Inpatient without Stair Climbing T-Scale Score : 26.48    ASSESSMENT:  Activity Tolerance:  Patient tolerance of  treatment: fair. Treatment Initiated: Treatment and education initiated within context of evaluation.   Evaluation time included review of current medical information, gathering information related session, patient left in safe position with all fall risk precautions in place.

## 2019-08-25 NOTE — PLAN OF CARE
improve  Description  Ability to maintain appropriate glucose levels will improve  Outcome: Ongoing  Note:   See labs     Problem: Musculor/Skeletal Functional Status  Goal: Highest potential functional level  Outcome: Ongoing     Problem: Musculor/Skeletal Functional Status  Goal: Absence of falls  Outcome: Ongoing    Care plan reviewed with patient and family. Patient and family verbalize understanding of the plan of care and contribute to goal setting.

## 2019-08-25 NOTE — PROGRESS NOTES
for 8/26/2019. On 8/23 had thoracocentesis  300 mL was drained on the right side. Being continued on Zosyn for possible pneumonia    8/24: Discussed with family about cancer and also will start on Imodium every 4 hours for diarrhea, oxycodone immediate release for pain control and did express to the family that patient's pain on the right side is from liver being enlarged and cancer most likely.   Will change Protonix drip to Protonix twice daily, start Midodrin for help with blood pressure, heard mild tinkling sound, will get Xray of abdomen to r/o Ileus start Megace to help with appetite, discussed about CODE STATUS, changed to limited x4, did not want any aggressive measures, family members were present at bedside.,  Replace electrolytes,     8/25-still has abdominal pain, severe mom medications helping him little, not passing gas, no more diarrhea, blood pressure still borderline continue IV fluids, KUB done yesterday shows distal large bowel obstruction versus sigmoid volvulus and general surgery was consulted and kept n.p.o., general surgeon felt that patient had a history of sigmoid colectomy in the past and less likely to be volvulus and recommended GI for decompression as the bowel is dilated around 14 cm, discussed with general surgeon Dr. Martha Perez, this morning at bedside, repeat KUB, replace electrolytes, the possibility of [place]'s syndrome cannot be ruled out, patient still wants to continue with the liver biopsy tomorrow     Subjective (past 24 hours): Continues to have occasional cough but no shortness of breath like before,  continues to have pain in the right side of the abdomen, 7-8/10, worse with palpation, feeling very weak and fatigued, still having watery diarrhea, 3-4 episodes at least, has very poor appetite,      Medications:  Reviewed    Infusion Medications    dextrose 5% and 0.9% NaCl with KCl 20 mEq 50 mL/hr at 08/25/19 2763     Scheduled Medications    potassium chloride  10 mEq

## 2019-08-25 NOTE — PROGRESS NOTES
physicians. Please don't hesitate to call me if any questions, issues  or concerns    Please see orders for updated patient care orders written today.   LIAM Mccarthy  8/25/2019   5:21 PM

## 2019-08-26 NOTE — PROCEDURES
Colonoscopy    Patient: Fany Lynch  : 1945  Acct#: [de-identified]      PHYSICIAN:  Suzen Spurling, MD    PREPROCEDURE DIAGNOSIS:  This is a 68 yo male admitted 19 transferred from [facility] for abdominal pain, diarrhea, liver mets, afib with RVR, acute respiratory failure, and hypotension. 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 Asked to perform sigmoidoscopy to make sure that there is not an obstruction and for possible colonic decompression. Tin Blackman had a sigmoid colectomy in the past over 10 years ago for colon cancer. MEDICATIONS: Conscious Sedation, versed 1 mg and fentanyl 0.00 mcg    PREMEDICATION:    IVCS start time:    IVCS stop time:    Transfer to recovery:      Fluoroscopy Assistance - Not available as hospital did not have a mat large enough for the riana lift, to transfer him to the fluoro bed. DESCRIPTION OF PROCEDURE: The risks of bleeding, trauma, infection, perforation, and medication-related cardiac and respiratory complications were discussed and written informed consent was obtained. After obtaining informed consent, a rectal exam was done. The patient was continuously monitored to ensure adequate sedation and patient safety. Subsequently, the colonoscope was placed in the rectum and advanced to the small intestine. The scope was  removed slowly while carefully examining color, mucosa, texture and anatomy of the colon were carefully examined with the scope. Findings and maneuvers are listed in impression below. The scope was removed. The patient was moved to the recovery area. Complications: The patient tolerated the procedure well. There were no immediate complications. Quality of colon prep: No prep was done. Modifier: Extra time was needed and technically difficult procedure in order to place the decompression tube. IMPRESSION:   1.  It appears that patient has had a ileocectomy or right hemicolectomy anatomy. 2. There was an extrinsic compression at the hepatic flexure area and at the splenic flexure. 3. Mild Diverticulosis sigmoid colon. 4. Using a guidewire through the scope and multiple devices to grasp the decompression tube 14 [facility], (we did not have a rat tooth forceps or grasping forceps, so a jumbo biopsy forceps was used at this was not big enough. We then used a Hemoclip to advance the decompression tube. The distal end of the tube was clipped to the mucosa for stability. 5. There were no masses within the colon. co2 insufflation was used for this procedure. RECOMMENDATIONS:    1. Pt. Will get  X-ray  Procedure to confirm placement and location of the decompression tube. 2.   Tube will be secured with tape. 3.   Tube to be placed to low intermittent suction. 4.    KUB in the AM.        5.    Resume previous orders.            Specimens: were not obtained    (The following sections must be completed)  Post-Sedation Vital Signs: Vital signs were reviewed and were stable after the procedure (see flow sheet for vitals)            Post-Sedation Exam: Lungs: clear to auscultation bilaterally and Cardiovascular: regular rate and rhythm           Complications: none            Belkis Stubbs MD, Colleen Reddy

## 2019-08-26 NOTE — PROGRESS NOTES
100/59 -- -- 81 24 95 % --   08/25/19 1057 (!) 100/57 96 °F (35.6 °C) Oral 86 20 93 % --   08/25/19 1054 (!) 91/51 -- -- 84 -- -- --   08/25/19 0849 (!) 90/57 98.8 °F (37.1 °C) Oral 80 20 93 % --         Intake/Output Summary (Last 24 hours) at 8/26/2019 0819  Last data filed at 8/26/2019 0413  Gross per 24 hour   Intake 1854 ml   Output 750 ml   Net 1104 ml       In: 940 [I.V.:940]  Out: 750 [Urine:50]    I/O last 3 completed shifts: In: 9925 [I.V.:1854]  Out: 750 [Urine:50; Stool:700]     Date 08/26/19 0000 - 08/26/19 2359   Shift 6214-3352 8577-0117 7815-7389 24 Hour Total   INTAKE   P.O.(mL/kg/hr) 0(0)   0   I. V.(mL/kg) 940(9.6)   940(9.6)   Shift Total(mL/kg) 940(9.6)   940(9.6)   OUTPUT   Urine(mL/kg/hr) 50(0.1)   50   Stool(mL/kg) 700(7.1)   700(7.1)   Shift Total(mL/kg) 750(7.7)   750(7.7)   Weight (kg) 98 98 98 98       Wt Readings from Last 3 Encounters:   08/26/19 216 lb (98 kg)        Body mass index is 29.29 kg/m².      Diet: Diet NPO Effective Now Exceptions are: Sips with Meds        MEDS:     Scheduled Meds:   midodrine  10 mg Oral TID WC    diltiazem  30 mg Oral 4 times per day    amiodarone  200 mg Oral Daily    pantoprazole  40 mg Intravenous Q12H    magnesium replacement protocol   Other RX Placeholder    potassium (CARDIAC) replacement protocol   Other RX Placeholder    metoprolol succinate  25 mg Oral Daily    piperacillin-tazobactam  3.375 g Intravenous Q8H     Continuous Infusions:   dextrose 5% and 0.9% NaCl with KCl 20 mEq 50 mL/hr at 08/25/19 1435     PRN Meds:  oxyCODONE 2.5 mg Q3H PRN   acetaminophen 650 mg Q4H PRN   HYDROmorphone 0.5 mg Q4H PRN   ondansetron 4 mg Q6H PRN         PHYSICAL EXAM:     CONSTITUTIONAL: No acute distress NG tube in place appears frail  ABDOMEN: Distended and tympanitic's tenderness on the right-hand side no peritoneal signs very hypoactive bowel sounds        LABS:     CBC: Recent Labs     08/25/19  0352 08/25/19  1243 08/25/19  1802   WBC 4.3*  --

## 2019-08-26 NOTE — PROGRESS NOTES
Gastroenterology Progress Note:     Patient Name:  Nissa Singh   MRN: [id number]  [id number]  YOB: 1945  Admit Date: 8/20/2019 12:06 AM  Primary Care Physician: Gideon Zuñiga DO   [id number]     Patient seen and examined. 24 hours events and chart reviewed. Subjective: Patient resting in bed. Continues to have abdominal pain. Denies nausea and vomiting. NG to LIWS with no output. Rectocele in place with approximately 300 mLs of clear, dark brown fluid out. Review of Systems  Neuro- negative for headache, dizziness, altered level of consciousness  Cardiovascular- negative for CP, SOB, peripheral edema, orthopnea  GI- negative for nausea, vomiting, dysphagia, odynophagia + abd pain + distention  - negative for dysuria, frequency, hematuria  Skin- negative for rash, or wounds  Psychiatric- negative for depression or anxiety  Hematologic- negative for spontaneous bleeding  Endocrine- negative for polyuria or polydipsia    Objective:  BP 97/60   Pulse 91   Temp 98 °F (36.7 °C) (Axillary)   Resp 18   Ht 6' (1.829 m)   Wt 216 lb (98 kg)   SpO2 93%   BMI 29.29 kg/m²     Physical Exam:    General:  Nourished in no distress  HEENT: Atraumatic, normocephalic. Moist oral mucous membranes. Neck: Supple without adenopathy, JVD, thyromegaly or masses. No JVD present. Trachea midline. CV: Normal rate, irregular rhythm, no murmurs, rubs, gallops. Resp: Even, easy without cough or accessory use. Rales noted to the right base. Abd: Round, soft. No hepatosplenomegaly or mass present. No bowel sounds auscultated. Softly distended, distention improved. Tender to RUQ upon palpation   Ext:  Without cyanosis, clubbing, edema. Skin: Pink, warm, dry  Neuro:  Alert, oriented x3 with no obvious deficits.        Rectal: deferred    Labs:   CBC:   Lab Results   Component Value Date    WBC 4.3 08/25/2019    HGB 10.1 08/25/2019    HCT 34.4 08/25/2019    MCV 99.7 08/25/2019     08/25/2019     BMP:

## 2019-08-26 NOTE — PROGRESS NOTES
Colonoscopy completed. Rectal tube removed. Replaced with 14 [place] decompression set. Photos taken. Patient tolerated well.

## 2019-08-26 NOTE — PROGRESS NOTES
Pt returned from CT. Pt drowsy with NG in place. Flexaseal in place as well draining small brown stool. No concerns at this time. Pt resting with family at bedside and call light in reach.

## 2019-08-26 NOTE — H&P
height is 6' (1.829 m) and weight is 216 lb (98 kg). His oral temperature is 98.1 °F (36.7 °C). His blood pressure is 98/57 (abnormal) and his pulse is 99. His respiration is 16 and oxygen saturation is 93%. Heart:  [x]Irregular  rhythm  []Other:    Lungs:  [x]Clear    []Other:    Abdomen: [x]Soft    []Other:    Mental Status: [x]Alert & Oriented  []Other:      VITAL SIGNS   Patient Vitals for the past 24 hrs:   BP Temp Temp src Pulse Resp SpO2 Weight   08/26/19 1753 (!) 98/57 98.1 °F (36.7 °C) Oral 99 16 93 % --   08/26/19 1216 97/60 -- -- 91 18 93 % --   08/26/19 1055 100/60 -- -- 89 16 92 % --   08/26/19 1042 (!) 91/54 98 °F (36.7 °C) Axillary 85 18 93 % --   08/26/19 0939 -- -- -- -- -- 92 % --   08/26/19 0935 (!) 95/49 -- -- 87 17 95 % --   08/26/19 0930 (!) 99/44 -- -- 85 16 95 % --   08/26/19 0925 (!) 97/51 -- -- 93 16 95 % --   08/26/19 0920 (!) 92/49 -- -- 92 17 95 % --   08/26/19 0915 (!) 100/50 -- -- 82 16 95 % --   08/26/19 0910 (!) 92/47 -- -- 88 15 95 % --   08/26/19 0905 (!) 92/54 -- -- 86 16 95 % --   08/26/19 0900 (!) 96/47 -- -- 83 20 95 % --   08/26/19 0850 94/62 -- -- 85 20 96 % --   08/26/19 0845 -- -- -- 83 21 91 % --   08/26/19 0828 (!) 97/56 -- -- 78 16 94 % --   08/26/19 0805 (!) 93/59 -- -- 86 16 93 % --   08/26/19 0625 (!) 99/58 -- -- 88 -- -- --   08/26/19 0413 (!) 92/57 98 °F (36.7 °C) Oral 90 16 95 % 216 lb (98 kg)   08/26/19 0008 (!) 97/57 98.1 °F (36.7 °C) Oral 85 16 95 % --   08/25/19 2104 108/61 97.6 °F (36.4 °C) Oral 84 18 94 % --       PLANNED PROCEDURE []EGD  []Colonoscopy  [x]Colonoscopy/Flex Sig/colonic Decompression  []ERCP []EUS   []Cystoscopy  [] CATH [] BRONCH   Consent: I have discussed with the patient and/or the patient representative the indication, alternatives, and the possible risks and/or complications of the planned procedure and the anesthesia methods. The patient and/or patient representative appear to understand and agree to proceed.     SEDATION (IVCS performed. )    Planned agent:[x]Midazolam []Meperidine [x]Sublimaze []Morphine  []Diazepam  []Other:       ASA Classification: Class 3 - A patient with severe systemic disease that limits activity but is not incapacitating    Bethel Hayes agrees to reverse his DNR Limited Code status for the purpose of the procedure and time during recovery from the procedure. Signed and witnessed on consent. Airway Assessment: normal    Monitoring and Safety: The patient will be placed on a cardiac monitor and vital signs, pulse oximetry and level of consciousness will be continuously evaluated throughout the procedure. The patient will be closely monitored until recovery from the medications is complete and the patient has returned to baseline status. Respiratory therapy will be on standby during the procedure. [x]Pre-procedure diagnostic studies complete and results available. Comment:    [x]Previous sedation/anesthesia experiences assessed. Comment:    [x]The patient is an appropriate candidate to undergo the planned procedure sedation and anesthesia. (Refer to nursing sedation/analgesia documentation record)  [x]Formulation and discussion of sedation/procedure plan, risks, and expectations with patient and/or responsible adult completed. [x]Patient examined immediately prior to the procedure.  (Refer to nursing sedation/analgesia documentation record)    Jerod Merchant MD   Electronically signed 8/26/2019 at 6:56 PM

## 2019-08-26 NOTE — PROGRESS NOTES
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 :   NPO, GI on aboard and general surgery consulted on 8/24 night, appreciate input, less likely to be sigmoid volvulus as the patient had a history of sigmoid colectomy in the past and recommended GI for decompression especially with the risk for perforation, will follow KUB repeat  8/26-worsening distention, 15 cm approximately, rectal tube and NG tube placed yesterday, 700 mL from the rectal tube awaiting for repeat KUB this morning,     New onset atrial fibrillation with rapid ventricular response: Currently rate controlled, treated with amiodarone drip, metoprolol, blood pressure on low side not getting metoprolol, transition amiodarone drip to oral p.o. 200 mg daily and start Cardizem 30 mg p.o. every 6 hourly  Cardiology on board  - rate controlled and still in a. Fib,     Hypotension secondary to dehydration and A. Fib: Improved with hydration  - still on low side, started midodrine,  And on gentle iVF    Hypokalemia and hypomagnesemia replaced    Cough with fever? Pneumonia vs intraabdominal source of fever:  On empiric antibiotics Zosyn      Bilateral pleural effusions, possibly from CHF versus hypoalbuminemia  -Status post thoracocentesis  - could not use lights criteria as S. LDH and protein not done, bloody tap    Acute respiratory failure, hypoxia, on oxygen, weaned off    Chronic diarrhea: Noninfectious, possibly secondary to malignancy cannot be ruled out, Imodium as needed  - not having any BM today as of 8/25    Abdominal pain suspected to be from malignancy, and hepatomegaly    cannot rule out peptic ulcer disease, on Protonix drip, changed to Protonix IV twice daily  -Continue Dilaudid better. Because of the metastasis on the liver oncology was consulted and AFP level was high at 5560. Oncologist recommended liver biopsy and is planned for 8/26/2019. On 8/23 had thoracocentesis  300 mL was drained on the right side. Being continued on Zosyn for possible pneumonia    8/24: Discussed with family about cancer and also will start on Imodium every 4 hours for diarrhea, oxycodone immediate release for pain control and did express to the family that patient's pain on the right side is from liver being enlarged and cancer most likely.   Will change Protonix drip to Protonix twice daily, start Midodrin for help with blood pressure, heard mild tinkling sound, will get Xray of abdomen to r/o Ileus start Megace to help with appetite, discussed about CODE STATUS, changed to limited x4, did not want any aggressive measures, family members were present at bedside.,  Replace electrolytes,     8/25-still has abdominal pain, severe mom medications helping him little, not passing gas, no more diarrhea, blood pressure still borderline continue IV fluids, KUB done yesterday shows distal large bowel obstruction versus sigmoid volvulus and general surgery was consulted and kept n.p.o., general surgeon felt that patient had a history of sigmoid colectomy in the past and less likely to be volvulus and recommended GI for decompression as the bowel is dilated around 14 cm, discussed with general surgeon Dr. Claudell Canes, this morning at bedside, repeat KUB, replace electrolytes, the possibility of [place]'s syndrome cannot be ruled out, patient still wants to continue with the liver biopsy tomorrow     8/26-still has significant pain in the abdomen, could not appreciate much bowel sounds, had NG tube and rectal tube placed  yesterday, 700 mL out, KUB done last midnight shows bowel dilation around 15 cm, repeat x-ray done today pending, potassium 3.6, had liver biopsy done, tolerated well, blood pressure still on the low side, gentle hydration, mild edema, no shortness of breath, will consider Lasix  General surgery recommending-that patient might benefit from endoscopic decompression    Subjective (past 24 hours): Denies any chest pain or shortness of breath, has significant pain in the abdomen all over the right side, medications helping him, no nausea or vomiting, feeling very weak and tired    Medications:  Reviewed    Infusion Medications    dextrose 5% and 0.9% NaCl with KCl 20 mEq 50 mL/hr at 08/25/19 1435     Scheduled Medications    midodrine  10 mg Oral TID WC    diltiazem  30 mg Oral 4 times per day    amiodarone  200 mg Oral Daily    pantoprazole  40 mg Intravenous Q12H    magnesium replacement protocol   Other RX Placeholder    potassium (CARDIAC) replacement protocol   Other RX Placeholder    metoprolol succinate  25 mg Oral Daily    piperacillin-tazobactam  3.375 g Intravenous Q8H     PRN Meds: oxyCODONE, acetaminophen, HYDROmorphone, ondansetron      Intake/Output Summary (Last 24 hours) at 8/26/2019 1152  Last data filed at 8/26/2019 0413  Gross per 24 hour   Intake 1854 ml   Output 750 ml   Net 1104 ml       Diet:  Diet NPO Effective Now Exceptions are: Sips with Meds    Exam:  /60   Pulse 89   Temp 98 °F (36.7 °C) (Axillary)   Resp 16   Ht 6' (1.829 m)   Wt 216 lb (98 kg)   SpO2 92%   BMI 29.29 kg/m²     Physical Examination:   General appearance - alert, awake  and in mild discomfort from abdominal pain,, NG tube, rectal tube present  HEENT: Normocephalic, Atraumatic, pupils reactive, mucous membranes little dry  Chest - moving equally to respiration,decreased air entry on rigth posterior, still very mild Rales at the bases on the right  Heart - normal rate, irregular rhythm, normal S1, S2, no murmurs,  Abdomen - soft, tender in right upper and lower and middle, distended,  BS-could not appreciate much today  Neurological - alert, oriented, normal speech, sensations intact and able to move

## 2019-08-26 NOTE — PROGRESS NOTES
Formulation and discussion of sedation / procedure plans, risks, benefits, side effects and alternatives with patient and/or responsible adult completed.     Electronically signed by Srinivas Liu DO on 8/26/2019 at 9:44 AM

## 2019-08-27 NOTE — PROGRESS NOTES
Gastroenterology Progress Note:     Patient Name:  Armida López   MRN: [id number]  [id number]  YOB: 1945  Admit Date: 8/20/2019 12:06 AM  Primary Care Physician: Reynaldo Driscoll DO   4A-17/017-A     Patient seen and examined. 24 hours events and chart reviewed. Subjective: Patient sleeping, wife at bedside. Continues to have abdominal pain. Denies nausea and vomiting. Review of Systems  Neuro- negative for headache, dizziness, altered level of consciousness  Cardiovascular- negative for CP, SOB, peripheral edema, orthopnea  GI- negative for nausea, vomiting, abd distention, dysphagia, odynophagia + abd pain  - negative for dysuria, frequency, hematuria  Skin- negative for rash, or wounds  Psychiatric- negative for depression or anxiety  Hematologic- negative for spontaneous bleeding  Endocrine- negative for polyuria or polydipsia    Objective:  BP (!) 91/54   Pulse 74   Temp 97.9 °F (36.6 °C) (Oral)   Resp 18   Ht 6' (1.829 m)   Wt 217 lb 9.6 oz (98.7 kg)   SpO2 94%   BMI 29.51 kg/m²     Physical Exam:    General:  No acute distress, ill appearing  HEENT: Atraumatic, normocephalic. Moist oral mucous membranes. Neck: Supple without adenopathy, JVD, thyromegaly or masses. No JVD present. Trachea midline. CV: Irregular rhythm, no murmurs, rubs, gallops. Resp: Even, easy without cough or accessory use. Lungs clear to ascultation bilaterally, diminished on the right. Abd: Round, soft. No hepatosplenomegaly or mass present. No distention noted, improved. Tender to right abdomen upon palpation. Few bowel sounds noted. Ext:  Without cyanosis, clubbing. Generalized edema noted. Skin: Pink, warm, dry  Neuro:  Alert, oriented x3 with no obvious deficits.        Rectal: deferred    Labs:   CBC:   Lab Results   Component Value Date    WBC 4.1 08/27/2019    HGB 9.9 08/27/2019    HCT 31.9 08/27/2019    MCV 99.1 08/27/2019     08/27/2019     BMP:   Lab Results   Component Value Date  08/27/2019    K 3.4 08/27/2019     08/27/2019    CO2 24 08/27/2019    PHOS 1.9 08/27/2019    BUN 11 08/27/2019    CREATININE 0.4 08/27/2019    CALCIUM 8.9 08/27/2019     PT/INR: No results found for: PROTIME, INR  Lipids:   Lab Results   Component Value Date    ALKPHOS 89 08/27/2019    ALT <5 08/27/2019    AST 22 08/27/2019    BILITOT 0.5 08/27/2019    BILIDIR 0.3 08/27/2019    LABALBU 2.3 08/27/2019    LIPASE 39.2 08/20/2019       Significant Diagnostic Studies:   08/26/19 KUB      Impression       1. Weighted enteric tube in stable position.       2. There is redemonstration of dilation of the large bowel with the cecum measuring approximately 15.8 cm. 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 08/26/19 decompression done endoscopically by Dr. Booker Byrd, tube placed and ordered to be to low intermittent suction    08/26/19 KUB status post decompression     Impression       Interval placement of a decompression colonic tube extending to the level of the mid transverse colon where it is looped upon itself but not kinked. Interval decompression of the previously noted marked gaseous distention of the colon. Residual moderate distention of the cecum measuring 11 cm in diameter. NG tube tip remains in the region of the mid stomach. 08/27/19 KUB      Impression       1. An enteric tube and colonic tube appear in similar position with the distal aspect of the rectal tube demonstrating coiling on itself at the splenic flexure.       2. There is a nonspecific, nonobstructive bowel gas pattern.  Gaseous distention of the cecum has improved compared to the prior exam and measures approximately 4.0 cm.             Current Meds:  Scheduled Meds:   lidocaine 1 % injection  5 mL Intradermal Once    sodium chloride flush  10 mL Intravenous 2 times per day    magnesium sulfate  2 g Intravenous Once    sodium phosphate

## 2019-08-27 NOTE — PROGRESS NOTES
[address] [address]  INPATIENT PHYSICAL THERAPY  DAILY NOTE  YANELY FRIASS 4A - 4A-17/017-A    Time In: 1020  Time Out: 1035  Timed Code Treatment Minutes: 15 Minutes  Minutes: 15          Date: 2019  Patient Name: Tangela Pinon,  Gender:  male        MRN: [id number]  : 1945  (69 y.o.)     Referring Practitioner: Luann PADILLA      Additional Pertinent Hx: Pt was admitted early on . Pt came to [facility] due to abdominal pain and diarrhea. Pt continues to have episodes of hypotension. Sepsis bolus was given and in addition, 1.5 L bolus all together. H&H Q6H have been ordered. Pt has been type & screened to be ready if the patient needs blood. GI consulted, they would like to scope when patient is more stable. Cardiology consulted to AFib RVR. Oncology consulted for metastatic lesions on the liver. CT Chest & Bone scan were negative for any masses or metastatic disease. CT liver biopsy will be on Monday. Pt is still SOB. US thoracentesis ordered for pleural effusion       Prior Level of Function:  Lives With: Spouse  Type of Home: House  Home Layout: One level  Home Access: Stairs to enter with rails  Home Equipment: 4 wheeled walker   Bathroom Toilet: Handicap height    ADL Assistance: Needs assistance  Homemaking Assistance: Needs assistance  Ambulation Assistance: Needs assistance  Transfer Assistance: Needs assistance  Additional Comments: Pt reports getting progressively more weak in the last few months. Daughter present and reports pt has been falling at home and having increased difficulty with mobility.   Daughter also reports pt has lost 60 lbs over the last few months     Restrictions/Precautions:  Restrictions/Precautions: Fall Risk, General Precautions, Up as Tolerated  Position Activity Restriction  Other position/activity restrictions: O2, going for liver biopsy on 19    SUBJECTIVE: with encouragement pt agreeable to try sitting edge of bed    PAIN: pt with pain in abdomen but did not rate    OBJECTIVE:  Bed Mobility:  Rolling to Left: Maximum Assistance   Rolling to Right: Maximum Assistance   Supine to Sit: Maximum Assistance  Sit to Supine: Maximum Assistance, X 2   Scooting: Maximum Assistance  HOB up 20 degrees, cues for sequencing and for pt to assist with transitions          Balance:  Static Sitting Balance:  Maximum Assistance, progressed to Raji to CGA with pt c/o lightheadedness and RN stated pt with low BP prior to session so returned to supine with RN aware    Performed sup BLE ankle pumps x10 reps    Functional Outcome Measures: Not completed       ASSESSMENT:  Assessment: Patient progressing toward established goals. Activity Tolerance:  Patient tolerance of  treatment: poor. Pt very limited tolerance for sitting edge of bed  Equipment Recommendations:Equipment Needed: No  Discharge Recommendations:  Continue to assess pending progress    Plan: Times per week: 3-5x GM   Times per day: Daily  Current Treatment Recommendations: Strengthening, Functional Mobility Training, ROM, Transfer Training, Balance Training, Endurance Training, Gait Training, Home Exercise Program, Patient/Caregiver Education & Training    Patient Education  Patient Education: Plan of Care    Goals:  Patient goals : To get stronger and return home   Short term goals  Time Frame for Short term goals:  At time of discharge   Short term goal 1: CGA x 1 to roll in bed for pressure relief   Short term goal 2: Pt to tolerate 15 reps B LE ther ex to improve strength for functional mobility  Short term goal 3: sidelying to/from sit with Raji to get in/out of bed  Short term goal 4: pt tolerate >12 min sitting balance activities with SBA to demonstrate inc strength for progression to transfers  Short term goal 5: PT to assess transfers/gait  Long term goals  Time Frame for Long term goals : no LTGs set secondary to short ELOS    Following session, patient left in safe position with all fall risk

## 2019-08-27 NOTE — PROGRESS NOTES
-- 86 22 97 % --   08/26/19 2025 (!) 89/52 -- -- 83 20 96 % --   08/26/19 2024 -- -- -- 89 17 97 % --   08/26/19 2023 (!) 84/46 -- -- 90 16 98 % --   08/26/19 2022 -- -- -- 93 17 98 % --   08/26/19 2021 (!) 97/51 -- -- 89 16 99 % --   08/26/19 2020 -- -- -- 98 17 98 % --   08/26/19 2019 (!) 94/51 -- -- 87 19 98 % --   08/26/19 2018 -- -- -- 97 17 97 % --   08/26/19 2017 (!) 108/59 -- -- 91 17 98 % --   08/26/19 2016 -- -- -- 95 17 97 % --   08/26/19 2015 (!) 102/56 -- -- 91 16 98 % --   08/26/19 2014 -- -- -- 95 18 98 % --   08/26/19 2013 (!) 98/59 -- -- 96 18 97 % --   08/26/19 2012 -- -- -- 97 19 97 % --   08/26/19 2011 (!) 99/59 -- -- 94 19 97 % --   08/26/19 2010 -- -- -- 97 19 97 % --   08/26/19 2009 (!) 103/59 -- -- 95 19 97 % --   08/26/19 2008 -- -- -- 98 20 97 % --   08/26/19 2007 (!) 101/58 -- -- 95 21 98 % --   08/26/19 2006 -- -- -- 96 21 97 % --   08/26/19 2005 (!) 98/57 -- -- 95 18 97 % --   08/26/19 2004 -- -- -- 95 17 98 % --   08/26/19 2003 (!) 90/53 -- -- 95 16 97 % --   08/26/19 2002 -- -- -- 92 20 97 % --   08/26/19 2001 (!) 84/50 -- -- 93 20 97 % --   08/26/19 2000 (!) 97/53 -- -- 95 20 98 % --   08/26/19 1959 -- -- -- 93 18 97 % --   08/26/19 1958 (!) 104/57 -- -- 94 17 96 % --   08/26/19 1957 -- -- -- 94 22 97 % --   08/26/19 1956 (!) 98/56 -- -- 94 22 97 % --   08/26/19 1955 -- -- -- 96 20 96 % --   08/26/19 1954 (!) 94/55 -- -- 92 26 97 % --   08/26/19 1953 -- -- -- 95 (!) 31 97 % --   08/26/19 1952 -- -- -- 95 18 96 % --   08/26/19 1951 -- -- -- 90 26 97 % --   08/26/19 1950 -- -- -- 89 19 97 % --   08/26/19 1949 (!) 100/56 -- -- 93 24 97 % --   08/26/19 1948 -- -- -- 90 21 97 % --   08/26/19 1947 -- -- -- 93 24 97 % --   08/1945 -- -- -- 99 26 98 % --   08/26/19 1945 -- -- -- 91 21 97 % --   08/26/19 1944 -- -- -- 89 23 97 % --   08/26/19 1943 (!) 102/55 -- -- 95 18 98 % --   08/26/19 1942 -- -- -- 92 21 98 % --   08/26/19 1941 -- -- -- 89 18 97 % --   08/26/19 1940 -- -- -- 90 18 97 Encounters:   08/27/19 217 lb 9.6 oz (98.7 kg)        Body mass index is 29.51 kg/m².      Diet: Diet NPO Effective Now Exceptions are: Sips with Meds        MEDS:     Scheduled Meds:   neostigmine  0.5 mg Subcutaneous Q24H    methylnaltrexone  12 mg Subcutaneous Daily    midodrine  10 mg Oral TID WC    diltiazem  30 mg Oral 4 times per day    amiodarone  200 mg Oral Daily    pantoprazole  40 mg Intravenous Q12H    magnesium replacement protocol   Other RX Placeholder    potassium (CARDIAC) replacement protocol   Other RX Placeholder    metoprolol succinate  25 mg Oral Daily    piperacillin-tazobactam  3.375 g Intravenous Q8H     Continuous Infusions:   dextrose 5% and 0.9% NaCl with KCl 20 mEq 50 mL/hr at 08/26/19 1209     PRN Meds:    oxyCODONE 2.5 mg Q3H PRN   acetaminophen 650 mg Q4H PRN   HYDROmorphone 0.5 mg Q4H PRN   ondansetron 4 mg Q6H PRN         PHYSICAL EXAM:     CONSTITUTIONAL: No acute distress NG tube in place appears frail  ABDOMEN: Distended and tympanitic's tenderness on the right-hand side no peritoneal signs very hypoactive bowel sounds        LABS:     CBC:   Recent Labs     08/25/19  0352 08/25/19  1243 08/25/19  1802 08/27/19  0410   WBC 4.3*  --   --  4.1*   RBC 3.45*  --   --  3.22*   HGB 10.5* 10.4* 10.1* 9.9*   HCT 34.4*  --   --  31.9*   MCV 99.7*  --   --  99.1*   MCH 30.4  --   --  30.7   MCHC 30.5*  --   --  31.0*     --   --  217   MPV 8.9*  --   --  9.0*      Last 3 CMP:   Recent Labs     08/25/19  0351 08/25/19  1802 08/26/19  0357 08/27/19  0410   *  --  138 139   K 3.0* 3.4* 3.6 3.4*   CL 99  --  105 106   CO2 24  --  23 24   BUN 10  --  12 11   CREATININE 0.4  --  0.4 0.4   GLUCOSE 156*  --  115* 109*   CALCIUM 8.9  --  9.1 8.9   PROT 5.9*  --   --  5.5*   LABALBU 2.5*  --   --  2.3*   BILITOT 0.5  --   --  0.5   ALKPHOS 85  --   --  89   AST 23  --   --  22   ALT <5*  --   --  <5*      Troponin: No results for input(s): TROPONINI in the last 72

## 2019-08-27 NOTE — PROGRESS NOTES
malignancy cannot be ruled out, Imodium as needed  - not having any BM today as of 8/25    Abdominal pain suspected to be from malignancy, and hepatomegaly    cannot rule out peptic ulcer disease, on Protonix drip, changed to Protonix IV twice daily  -Continue Dilaudid as needed and start oxycodone immediate release low-dose 2.5 mg every 3 hours as needed     Severe protein calorie malnutrition: 60 pound weight loss, and last 6 months, encourage dietary supplementation, will start appetite stimulants-Megace on Bowel obstruction improves   8/27- get a PICC line and TPN    Liver metastasis, most likely from colon cancer: AFP level 5660  -had liver  biopsy on 8/26/2019    Physical debility continue PT and OT    Coronary artery disease status post CABG without angina: Metoprolol if blood pressure allows, hold antiplatelet agents     Essential hypertension: Metoprolol if blood pressure tolerates    Diabetes mellitus type 2, without complications not requiring insulin: Hold oral medications, sliding scale insulin     History of smoking quit 2 years ago-     Incidental finding of ascending aortic aneurysm 4.6 x 4.5 cm  Infrarenal aneurysm    History of colon cancer and bowel perforation, 30 years ago status post partial colon resection    ? History of DVT    Expected discharge date:  3-4 days    Disposition:    [x] Home       [] TCU       [] Rehab       [] Psych       [] SNF       [] [place]       [] Other-    Chief Complaint: Abdominal pain, chronic diarrhea, cough, asked by PCP to come to the emergency room    Patient was transferred from [facility] for further evaluation and treatment as the patient was hypotensive with blood pressure in the 80s and hypoxic with saturation in the 80s and needing oxygen and fluids.   Patient apparently has not been feeling well for the last 6 months and did initially see his PCP and had basic lab work and nothing else was done and patient was discouraged and did right  With compressive atelectasis and aneurysm of the ascending aorta, 4.5 cm. Marked gaseous distention of the colon was noted. Amiodarone drip was continued for 4 days as the patient still continued to have A. fib with RVR and was able to get 1-2 doses of metoprolol as the blood pressure was little better. Because of the metastasis on the liver oncology was consulted and AFP level was high at 5560. Oncologist recommended liver biopsy and is planned for 8/26/2019. On 8/23 had thoracocentesis  300 mL was drained on the right side. Being continued on Zosyn for possible pneumonia    8/24: Discussed with family about cancer and also will start on Imodium every 4 hours for diarrhea, oxycodone immediate release for pain control and did express to the family that patient's pain on the right side is from liver being enlarged and cancer most likely.   Will change Protonix drip to Protonix twice daily, start Midodrin for help with blood pressure, heard mild tinkling sound, will get Xray of abdomen to r/o Ileus start Megace to help with appetite, discussed about CODE STATUS, changed to limited x4, did not want any aggressive measures, family members were present at bedside.,  Replace electrolytes,     8/25-still has abdominal pain, severe mom medications helping him little, not passing gas, no more diarrhea, blood pressure still borderline continue IV fluids, KUB done yesterday shows distal large bowel obstruction versus sigmoid volvulus and general surgery was consulted and kept n.p.o., general surgeon felt that patient had a history of sigmoid colectomy in the past and less likely to be volvulus and recommended GI for decompression as the bowel is dilated around 14 cm, discussed with general surgeon Dr. Martha Perez, this morning at bedside, repeat KUB, replace electrolytes, the possibility of Ant's syndrome cannot be ruled out, patient still wants to continue with the liver biopsy tomorrow     8/26-still has significant pain in the abdomen, could not appreciate much bowel sounds, had NG tube and rectal tube placed  yesterday, 700 mL out, KUB done last midnight shows bowel dilation around 15 cm, repeat x-ray done today pending, potassium 3.6, had liver biopsy done, tolerated well, blood pressure still on the low side, gentle hydration, mild edema, no shortness of breath, will consider Lasix  General surgery recommending-that patient might benefit from endoscopic decompression    8/2 GI did colonoscopy and colonic decompression done last night, and decompression tube was left in the colon, bowel not distended anymore, 4 cm currently, noticed extrinsic compression at the hepatic flexure and splenic flexure, still n.p.o., will get PICC line, TPN, patient not sure about what he wants once biopsy results are back, will get palliative care, awaiting for biopsy results, once TPN started we will stop IV fluids  -Has urinary retention, bladder scan 318 mL,  Ricardo's placed    Subjective (past 24 hours):  Has occasional cough but no chest pain or shortness of breath, still continues to have significant pain in the abdomen on the right side and also on the left lower side today    Medications:  Reviewed    Infusion Medications    dextrose 5% and 0.9% NaCl with KCl 20 mEq 50 mL/hr at 08/26/19 1209     Scheduled Medications    potassium chloride  10 mEq Intravenous Q2H    neostigmine  0.5 mg Subcutaneous Q24H    methylnaltrexone  12 mg Subcutaneous Daily    midodrine  10 mg Oral TID WC    diltiazem  30 mg Oral 4 times per day    amiodarone  200 mg Oral Daily    pantoprazole  40 mg Intravenous Q12H    magnesium replacement protocol   Other RX Placeholder    potassium (CARDIAC) replacement protocol   Other RX Placeholder    metoprolol succinate  25 mg Oral Daily    piperacillin-tazobactam  3.375 g Intravenous Q8H     PRN Meds: oxyCODONE, acetaminophen, HYDROmorphone, ondansetron      Intake/Output Summary (Last 24 hours) at 8/27/2019 0721  Last data filed at 8/27/2019 0352  Gross per 24 hour   Intake 2051.81 ml   Output 500 ml   Net 1551.81 ml       Diet:  Diet NPO Effective Now Exceptions are: Sips with Meds    Exam:  BP (!) 93/55   Pulse 89   Temp 97.9 °F (36.6 °C) (Oral)   Resp 18   Ht 6' (1.829 m)   Wt 217 lb 9.6 oz (98.7 kg)   SpO2 100%   BMI 29.51 kg/m²     Physical Examination:   General appearance - alert, awake and in mild discomfort from abdominal pain,, NG tube, rectal tube present  HEENT: Normocephalic, Atraumatic, pupils reactive, mucous membranes dry  Chest - moving equally to respiration,decreased air entry on rigth posterior,   Heart - normal rate, irregular rhythm, normal S1, S2, no murmurs,  Abdomen - soft, tender in right upper and lower and middle, and left lower, distended,  BS-could not appreciate much   Neurological - alert, oriented, normal speech, sensations intact and able to move all extremities - very weak in lower 3-4/5  Extremities - peripheral pulses normal, 2+ b/l pedal edema,  Capillary refill less than 3 sec  Skin - normal coloration and turgor, no rashes        Labs:   Recent Labs     08/25/19  0352 08/25/19  1243 08/25/19  1802 08/27/19  0410   WBC 4.3*  --   --  4.1*   HGB 10.5* 10.4* 10.1* 9.9*   HCT 34.4*  --   --  31.9*     --   --  217     Recent Labs     08/25/19  0351 08/25/19  1802 08/26/19  0357 08/27/19  0410   *  --  138 139   K 3.0* 3.4* 3.6 3.4*   CL 99  --  105 106   CO2 24  --  23 24   BUN 10  --  12 11   CREATININE 0.4  --  0.4 0.4   CALCIUM 8.9  --  9.1 8.9     Recent Labs     08/25/19  0351 08/27/19  0410   AST 23 22   ALT <5* <5*   BILIDIR 0.3 0.3   BILITOT 0.5 0.5   ALKPHOS 85 89     No results for input(s): INR in the last 72 hours. No results for input(s): Suzanne Doni in the last 72 hours.     Microbiology:      Urinalysis:      Lab Results   Component Value Date    NITRU NEGATIVE 08/20/2019    BLOODU NEGATIVE 08/20/2019    GLUCOSEU NEGATIVE 08/20/2019 8/26/2019 1:10 PM      RADIOLOGY REPORT   Final Result      XR ABDOMEN (KUB) (SINGLE AP VIEW)   Final Result      Interval NG tube placement, tip in the proximal/mid stomach in satisfactory position. Persistent colonic ileus/Ant's syndrome with progressive distention of the cecum measuring 15 cm. There has been proven in the degree of dilatation of the transverse colon. This does not have the typical appearance of a sigmoid volvulus. **This report has been created using voice recognition software. It may contain minor errors which are inherent in voice recognition technology. **      Final report electronically signed by Dr. Latoya Cordon on 8/26/2019 12:06 AM      XR CHEST PORTABLE   Final Result      1. Esophageal route tube is seen coursing below the level of the diaphragm without visualization of its distal tip. It is likely terminating in the region of the stomach. 2. Cardiomegaly and changes of pulmonary edema. 3. Dilated loops of bowel are seen beneath the left hemidiaphragm, similar to the previous study. **This report has been created using voice recognition software. It may contain minor errors which are inherent in voice recognition technology. **      Final report electronically signed by Dr Lester Purdy on 8/25/2019 3:28 PM      XR ABDOMEN (KUB) (SINGLE AP VIEW)   Final Result   Redemonstration of marked gaseous distention of the large bowel, similar to prior exam. Differential considerations include distal large bowel obstruction and cecal or sigmoid volvulus. **This report has been created using voice recognition software. It may contain minor errors which are inherent in voice recognition technology. **      Final report electronically signed by Dr. Lexi Medeiros MD on 8/25/2019 10:12 AM      XR ABDOMEN (KUB) (SINGLE AP VIEW)   Final Result   Markedly distended loops of gas-filled colon are seen throughout the abdomen.  Minimal air is seen

## 2019-08-28 NOTE — PROGRESS NOTES
08/28/2019     08/28/2019     BMP:   Lab Results   Component Value Date     08/28/2019    K 3.7 08/28/2019     08/28/2019    CO2 26 08/28/2019    PHOS 2.1 08/28/2019    BUN 11 08/28/2019    CREATININE 0.3 08/28/2019    CALCIUM 8.8 08/28/2019     PT/INR: No results found for: PROTIME, INR  Lipids:   Lab Results   Component Value Date    ALKPHOS 89 08/27/2019    ALT <5 08/27/2019    AST 22 08/27/2019    BILITOT 0.5 08/27/2019    BILIDIR 0.3 08/27/2019    LABALBU 2.3 08/27/2019    LIPASE 39.2 08/20/2019       Significant Diagnostic Studies:   KUB 08/28/19      Impression       1. Stable position and appearance of a colonic tube which is coiled on itself at the region of the splenic flexure. The tip is again noted to extend into the transverse colon. A weighted enteric tube also appears in similar position and there is partial    visualization of a tube overlying the pelvis which may correspond to a rectal tube or Ricardo catheter.       2. A nonspecific, nonobstructive bowel gas pattern is again noted.  A small amount of gas is again present within the cecum which measures approximately 3.3 cm in diameter.         Current Meds:  Scheduled Meds:   phosphorus replacement protocol   Other RX Placeholder    sodium phosphate IVPB  15 mmol Intravenous Once    magnesium sulfate  2 g Intravenous Once    bumetanide  0.5 mg Intravenous Once    lidocaine 1 % injection  5 mL Intradermal Once    sodium chloride flush  10 mL Intravenous 2 times per day    insulin lispro  0-12 Units Subcutaneous Q6H    neostigmine  0.5 mg Subcutaneous Q24H    methylnaltrexone  12 mg Subcutaneous Daily    midodrine  10 mg Oral TID WC    diltiazem  30 mg Oral 4 times per day    amiodarone  200 mg Oral Daily    pantoprazole  40 mg Intravenous Q12H    magnesium replacement protocol   Other RX Placeholder    potassium (CARDIAC) replacement protocol   Other RX Placeholder    metoprolol succinate  25 mg Oral Daily

## 2019-08-28 NOTE — PROGRESS NOTES
ONCOLOGY PROGRESS NOTE    Pt Name: Shon Wooten  MRN: [id number]  YOB: 1945  Provider: Vanessa Siddiqi MD  Primary Care Physician: Joe Lewis DO    Patient seen and examined/24 hour events and chart reviewed. Feeling:  better    Vitals  Vitals:    08/27/19 2012   BP: 91/61   Pulse: 87   Resp: 18   Temp: 97.4 °F (36.3 °C)   SpO2: 95%       Physical Exam  General:  thin/frail  HEENT:  Head: Normocephalic, no lesions, without obvious abnormality. Mucosa:  moist  Neck:  supple   CVS:  regular rate and rhythm, S1, S2 normal, no murmur, click, rub or gallop     Lungs:  clear to auscultation bilaterally  Abd:  soft  Ext:  no cyanosis, clubbing or edema present    Skin:  Skin color, texture, turgor normal. No rashes or lesions. Neuro:  Alert, oriented, thought content appropriate    Lab Results  CBC:   Lab Results   Component Value Date    WBC 4.1 08/27/2019    RBC 3.22 08/27/2019    HGB 9.9 08/27/2019    HCT 31.9 08/27/2019    MCV 99.1 08/27/2019    MCH 30.7 08/27/2019    MCHC 31.0 08/27/2019     08/27/2019    MPV 9.0 08/27/2019       Imaging  CT    Assessment   Patient Active Problem List:     Fever     Severe malnutrition (HCC)     Persistent atrial fibrillation (HCC)     Large bowel obstruction (HCC)     Liver metastases (HCC)     Elevated AFP     History of colon cancer     Hypotension due to hypovolemia     Chronic diarrhea     Coronary artery disease involving coronary bypass graft of native heart without angina pectoris     Bilateral pleural effusion     Hypoalbuminemia     Hypokalemia     Colon distention     Ant's syndrome     Acute urinary retention  S/p Liver Bx      Plan  Will follow liver Bx results  D/w pt and his R.N. About the current plan of care. Labs, cultures, and radiographs reviewed. Changes made as necessary. D/w Patient's R.N. and specific instructions given and issues addressed. Will follow the patient closely with you.  Also please see the orders for

## 2019-08-28 NOTE — PROGRESS NOTES
and issues addressed. Will follow the patient closely with you. Also please see the orders for updated patient care. Thank you for involving us in this patient's care. I will be discussing this case with the treating physicians. Please don't hesitate to call me if any questions, issues  or concerns    Please see orders for updated patient care orders written today.   LIAM Kamara  8/28/2019   6:01 PM

## 2019-08-28 NOTE — PROGRESS NOTES
8/28/19: Glucose 141, Phosphorus 2.1. Humalog coverage, phosphorus replacement, relistor, neostigmine. · Wound Type: (8/21/19 \"purple\" pressure injury buttocks)  · Current Nutrition Therapies:  · Oral Diet Orders: NPO   · Parenteral Nutrition Orders:  · Type and Formula: Premix Central(Clinimix 5/15)   · Rate/Volume: 50 ml/hr  · Duration: Continuous  · Current PN Order Provides: ~ 852 kcals, 60 grams protein, 180 grams CHO per 24 hours  · Goal PN Orders Provides: Recommend tonight change to TPN 3:1: 85 kg to dose, 10 kcals/kg/day, 20% lipid kcals, 1 gram protein/kg/day to provide~ 850 kcals, 85 grams protein, and 100 grams CHO per 24 hours  · Anthropometric Measures:  · Ht: 6' (182.9 cm)   · Current Body Wt: 217 lb 9.6 oz (98.7 kg)(8/28/19 +1 RLE, +2 LLE, +1 sacral edema)  · Admission Body Wt: 201 lb 4.5 oz (91.3 kg)(8/20; +trace edema BLE)  · Usual Body Wt: 210 lb (95.3 kg)(per pt report x1 week ago. No weight hx per EMR)  · % Weight Change:  ,  -3.8% weight loss in one week per pt report- no weight hx per EMR to confirm reported weight loss  · Ideal Body Wt: 178 lb (80.7 kg),   · Adjusted Body Wt: 188 lb (85.3 kg),   · BMI Classification: BMI 25.0 - 29.9 Overweight    Nutrition Interventions:   Continue NPO, Modify current Parenteral Nutrition  Continued Inpatient Monitoring, Education not appropriate at this time, Coordination of Care    Nutrition Evaluation:   · Evaluation: Progressing toward goals   · Goals: Pt will receive PN to meet 75% or more of estimated nutrient needs until able to transition to oral feeds during LOS.    · Monitoring: Nutrition Progression, PN Intake, PN Tolerance, Wound Healing, Weight, Pertinent Labs, Monitor Bowel Function      Electronically signed by Rosa Green RD, LD on 8/28/19 at 10:46 AM    Contact Number: [phone]

## 2019-08-28 NOTE — PROGRESS NOTES
around 27 cm with numerous echogenic masses with the largest mass in the right lobe measuring around 10.7 x 7 x 9.8 cm in largest mass in the left lobe measuring around three-point times four 3.6 x 3.3 cm. Patient did have pleural effusion and subsequently CT of the chest with contrast was done and showed bilateral effusions large on the right  With compressive atelectasis and aneurysm of the ascending aorta, 4.5 cm. Marked gaseous distention of the colon was noted. Amiodarone drip was continued for 4 days as the patient still continued to have A. fib with RVR and was able to get 1-2 doses of metoprolol as the blood pressure was little better. Because of the metastasis on the liver oncology was consulted and AFP level was high at 5560. Oncologist recommended liver biopsy and is planned for 8/26/2019. On 8/23 had thoracocentesis  300 mL was drained on the right side. Being continued on Zosyn for possible pneumonia    8/24: Discussed with family about cancer and also will start on Imodium every 4 hours for diarrhea, oxycodone immediate release for pain control and did express to the family that patient's pain on the right side is from liver being enlarged and cancer most likely.   Will change Protonix drip to Protonix twice daily, start Midodrin for help with blood pressure, heard mild tinkling sound, will get Xray of abdomen to r/o Ileus start Megace to help with appetite, discussed about CODE STATUS, changed to limited x4, did not want any aggressive measures, family members were present at bedside.,  Replace electrolytes,     8/25-still has abdominal pain, severe mom medications helping him little, not passing gas, no more diarrhea, blood pressure still borderline continue IV fluids, KUB done yesterday shows distal large bowel obstruction versus sigmoid volvulus and general surgery was consulted and kept n.p.o., general surgeon felt that patient had a history of sigmoid colectomy in the past and less Latoya Cordon on 8/26/2019 12:06 AM      XR CHEST PORTABLE   Final Result      1. Esophageal route tube is seen coursing below the level of the diaphragm without visualization of its distal tip. It is likely terminating in the region of the stomach. 2. Cardiomegaly and changes of pulmonary edema. 3. Dilated loops of bowel are seen beneath the left hemidiaphragm, similar to the previous study. **This report has been created using voice recognition software. It may contain minor errors which are inherent in voice recognition technology. **      Final report electronically signed by Dr Lester Purdy on 8/25/2019 3:28 PM      XR ABDOMEN (KUB) (SINGLE AP VIEW)   Final Result   Redemonstration of marked gaseous distention of the large bowel, similar to prior exam. Differential considerations include distal large bowel obstruction and cecal or sigmoid volvulus. **This report has been created using voice recognition software. It may contain minor errors which are inherent in voice recognition technology. **      Final report electronically signed by Dr. Lexi Medeiros MD on 8/25/2019 10:12 AM      XR ABDOMEN (KUB) (SINGLE AP VIEW)   Final Result   Markedly distended loops of gas-filled colon are seen throughout the abdomen. Minimal air is seen distal to the descending colon. These findings could represent a distal large bowel obstruction. A sigmoid volvulus could also have this appearance. Case was discussed by Dr. Sherrill Jacobsen with Dr. Sunitha So at approximately 6:50 PM 8/24/2019 via telephone. **This report has been created using voice recognition software. It may contain minor errors which are inherent in voice recognition technology. **      Final report electronically signed by Dr Lester Purdy on 8/24/2019 6:58 PM      XR CHEST PA INSPIRATION 1 VW   Final Result   No pneumothorax post right thoracentesis.             **This report has been created using voice recognition software. It may contain minor errors which are inherent in voice recognition technology. **      Final report electronically signed by Dr. Concetta Carter on 8/23/2019 5:19 PM      US THORACENTESIS   Final Result      Successful ultrasound-guided thoracentesis with  0.3 L of fluid drained. **This report has been created using voice recognition software. It may contain minor errors which are inherent in voice recognition technology. **      Final report electronically signed by Dr. Concetta Carter on 8/23/2019 5:21 PM      NM BONE SCAN WHOLE BODY   Final Result   1. No scintigraphic evidence of osseous metastatic disease. 2. Probable degenerative arthropathic changes as detailed above. Final report electronically signed by Dr. Jenna Quintero on 8/23/2019 1:15 PM      CT Chest W Contrast   Final Result   1. Bilateral pleural effusions with a large right pleural effusion and smaller left pleural effusion. Bilateral areas of compressive atelectasis from the pleural effusions. No separate airspace consolidation identified. 2. No lung masses identified. 3. 4.5 cm ascending aortic aneurysm. 4. Marked gaseous distention of the colon. **This report has been created using voice recognition software. It may contain minor errors which are inherent in voice recognition technology. **      Final report electronically signed by Dr. Cesar Sheridan on 8/23/2019 10:16 AM      XR CHEST PORTABLE   Final Result      Bilateral interstitial infiltrates consistent with mild pulmonary edema. Moderate to severe distention of bowel, likely the colon, in the left upper quadrant. Recommend KUB. **This report has been created using voice recognition software. It may contain minor errors which are inherent in voice recognition technology. **      Final report electronically signed by Dr. Alma Gandhi on 8/23/2019 7:30 AM      US LIVER   Final Result   Marked hepatomegaly with multiple liver

## 2019-08-28 NOTE — FLOWSHEET NOTE
08/28/19 1224   Provider Notification   Reason for Communication Review case   Provider Name Dr Arlene Salas   Provider Notification Physician   Method of Communication Secure Message   Response Waiting for response   Notification Time [id number]   on assessment this morning, patient has large mass on left side of neck (thyroid) area that has not been documented.  I wanted to let you know if this actually was a new finding

## 2019-08-29 NOTE — PROGRESS NOTES
Discussed with primary RN Tiffany. Repeat liver biopsy will likely be done outpatient. Current code status is Limited Code: No shock, No CPR, No intubation, No resuscitative meds. Palliative care will follow as needed.

## 2019-08-29 NOTE — PLAN OF CARE
Problem: Nutrition  Goal: Optimal nutrition therapy  Description     Outcome: Ongoing   Nutrition Problem: Severe malnutrition, In context of acute illness or injury  Intervention: Food and/or Nutrient Delivery: Start ONS, Continue Parenteral Nutrition  Nutritional Goals: Pt will receive 75% of nutrient intake via PN and oral intake during LOS until able to transition to sole oral feeds.

## 2019-08-29 NOTE — PROGRESS NOTES
[address]  Occupational Therapy  Daily Note  Time:    Time In: 1150  Time Out: 1233  Timed Code Treatment Minutes: 37 Minutes  Minutes: 43          Date: 2019  Patient Name: Chandrakant Moon,   Gender: male      Room: [facility]/017-A  MRN: [id number]  : 1945  (69 y.o.)  Referring Practitioner: Papa PADILLA  Diagnosis: fever  Additional Pertinent Hx: Per ER note on 19:   69 y.o. male who presents to the Emergency Department by EMS from [facility] as and ED to ED transfer. Patient was initially supposed to be a direct admit, but there are no beds available at this time. Wife is at bedside. Patient has a history of colon cancer with mets to the liver. Patient reports weakness, fatigue, dizziness, and decreased appetite over the past 5 to 7 days. He then noticed blood in his stool this morning which progressed throughout the day prompting his visit to [facility]. Patient was found to have new onset A-fib    Restrictions/Precautions:  Restrictions/Precautions: Fall Risk, General Precautions, Up as Tolerated  Position Activity Restriction  Other position/activity restrictions: O2, going for liver biopsy on 19    SUBJECTIVE: Pt awake in bed with family present. Pt stating he is feeling better today and agreeable to trying OT session     PAIN: yes but did not state a number/10: right side     COGNITION: WNL    ADL:   Lower Extremity Dressing: Maximum Assistance. donning socks  Toileting: Maximum Assistance. for clothgin management after sitting on bed pan at EOB . BALANCE:  Sitting Balance:  Stand By Assistance  Standing Balance: Contact Guard Assistance, Minimal Assistance  inside [place] falls on 4 trials pt expressing increased pain in bilateral LEs when standing/ Pt tolerating standing for approx 1 min x 2 trials     BED MOBILITY:  Supine to Sit: Moderate Assistance with cues for tech and ballesteros dplacement.  Pt having diffiuclty moving right LE

## 2019-08-29 NOTE — PROGRESS NOTES
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 :   NPO, GI on aboard and general surgery consulted on 8/24 night, appreciate input, less likely to be sigmoid volvulus as the patient had a history of sigmoid colectomy in the past and recommended GI for decompression especially with the risk for perforation, will follow KUB repeat  8/26-worsening distention, 15 cm approximately, rectal tube and NG tube placed yesterday, 700 mL from the rectal tube awaiting for repeat KUB this morning,  8/27-patient had colonic decompression, noticed to have extrinsic compression at the hepatic flexure and at the splenic flexure, decompression tube left in the colon, improved on repeat KUB this morning, bowel dilated 4 cm,, relistor and neostigmine daily from 8/26 8/28-bowel sounds improved, still has abdominal pain in the right upper quadrant, awaiting for x-ray this morning, h has decompression tube and NG tube, on relisor and neostigmine   8/29-resolved, NG tube removed, will remove rectal tube, advance diet and tolerating well, bowel sounds good, encourage activity    New onset atrial fibrillation with rapid ventricular response: Currently rate controlled, treated with amiodarone drip, metoprolol, blood pressure on low side not getting metoprolol, transition amiodarone drip to oral p.o. 200 mg daily and start Cardizem 30 mg p.o. every 6 hourly  Cardiology on board  - rate controlled and still in a. Fib,   -We will follow with cardiology about long-term anticoagulation    Hypotension secondary to dehydration and A. Fib: Improved with hydration  - still on low side, started midodrine,  And on gentle iVF  8/28 stop IV fluids as TPN is started  8/29 blood pressure on the

## 2019-08-30 NOTE — PROGRESS NOTES
Hospitalist Progress Note    Patient:  Fany Lynch      Unit/Bed:4A-17/017-A    YOB: 1945    MRN: [id number]       Acct: [de-identified]     PCP: Christen Andrade DO    Date of Admission: 8/20/2019    Assessment/Plan:    Liver metastasis suspected, most likely from colon cancer: AFP level 5660  -had liver  biopsy on 8/26/2019  -Biopsy showed fibroelastic tissue, oncology recommending repeat biopsy, and IR planning for next week ?  9/3/19    Abdominal pain suspected to be from malignancy, and hepatomegaly    cannot rule out peptic ulcer disease, on Protonix drip, changed to Protonix IV twice daily  -Continue Dilaudid as needed and start oxycodone immediate release low-dose 2.5 mg every 3 hours as needed   8/30-consult pain management, start OxyContin extended release 10 mg twice daily and increase immediate release to 5 mg as needed and bowel regimen      New onset atrial fibrillation with rapid ventricular response: Currently rate controlled, treated with amiodarone drip, metoprolol, blood pressure on low side not getting metoprolol, transition amiodarone drip to oral p.o. 200 mg daily and start Cardizem 30 mg p.o. every 6 hourly  Cardiology on board  - rate controlled and still in a. Fib,   -We will follow with cardiology about long-term anticoagulation  8/30-Cardizem changed to long-acting 120 mg p.o. daily and metoprolol unable to take it because of blood pressure being low, decrease dose to 12.5 mg twice daily with hold parameters, start Lovenox therapeutic 1 mg twice daily, occult blood negative    Hypotension secondary to dehydration and A. Fib: Improved with hydration  - still on low side, started midodrine,  And on gentle iVF  8/28 stop IV fluids as TPN is started  8/29 blood pressure on the low side but stable  8/30 continue Midodrin current dose    Acute urinary retention, Ricardo's placed on 8/27  Will give a trial once patient more active-    Hypokalemia and hypomagnesemia regimen, stop Relistor and neostigmine  -transfer to telemetry bed from stepdown  -We will give trial without Ricardo once more active    Subjective (past 24 hours): Patient continues to have significant pain in the right side of the abdomen, 7-8/10, feeling very weak and tired, tolerated food, having soft stools    Medications:  Reviewed    Infusion Medications    PN-Adult  3 IN 1 Central Line (Custom) 80 mL/hr at 08/29/19 1734    dextrose       Scheduled Medications    diltiazem  120 mg Oral Daily    phosphorus replacement protocol   Other RX Placeholder    lidocaine 1 % injection  5 mL Intradermal Once    sodium chloride flush  10 mL Intravenous 2 times per day    insulin lispro  0-12 Units Subcutaneous Q6H    midodrine  10 mg Oral TID WC    amiodarone  200 mg Oral Daily    pantoprazole  40 mg Intravenous Q12H    magnesium replacement protocol   Other RX Placeholder    potassium (CARDIAC) replacement protocol   Other RX Placeholder     PRN Meds: loperamide, sodium chloride flush, glucose, dextrose, glucagon (rDNA), dextrose, oxyCODONE, acetaminophen, HYDROmorphone, ondansetron      Intake/Output Summary (Last 24 hours) at 8/30/2019 0724  Last data filed at 8/30/2019 0341  Gross per 24 hour   Intake 2450.62 ml   Output 3850 ml   Net -1399.38 ml       Diet:  Dietary Nutrition Supplements: Clear Liquid Oral Supplement  DIET FULL LIQUID;  PN-Adult  3 IN 1 Central Line (Custom)    Exam:  /71   Pulse 97   Temp 98.4 °F (36.9 °C) (Oral)   Resp 20   Ht 6' (1.829 m)   Wt 230 lb 14.4 oz (104.7 kg)   SpO2 93%   BMI 31.32 kg/m²     Physical Examination:   General appearance - alert, awake and in no distress at rest, pain on palpation on the abdomen, oxygen via nasal cannula, PICC line  HEENT: Normocephalic, Atraumatic, pupils reactive, mucous membranes moist  Chest - moving equally to respiration,decr decreased air entry bilateral, could not appreciate any rales today  Heart - normal rate, irregular graft of native heart without angina pectoris [I25.810]     Bilateral pleural effusion [J90]     Hypoalbuminemia [E88.09]     Hypokalemia [E87.6]     Colon distention [K63.89]     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

## 2019-08-30 NOTE — PROGRESS NOTES
Nutrition Assessment (Parenteral Nutrition)    Type and Reason for Visit: Reassess(TPN management)    Nutrition Recommendations:   Continue current diet  Continue ensure clear TID  Recommend multivitamin    Nutrition Assessment: Pt. with no improvement from a nutritional standpoint AEB patient report of no intake yet today and not hungry due to pain. Remains at risk for further nutritional compromise r/t altered GI function (ogilvies syndrome), poor appetite today due to pain (RN aware and getting pt's pain meds). Will follow recommendations as noted above. Malnutrition Assessment:  · Malnutrition Status: Meets the criteria for severe malnutrition  · Context: Acute illness or injury  · Findings of the 6 clinical characteristics of malnutrition (Minimum of 2 out of 6 clinical characteristics is required to make the diagnosis of moderate or severe Protein Calorie Malnutrition based on AND/ASPEN Guidelines):  1. Energy Intake-Less than or equal to 50% of estimated energy requirement, Greater than or equal to 7 days    2. Weight Loss-(-3.8% weight loss per pt report), in 1 week  3. Fat Loss-No significant subcutaneous fat loss,    4. Muscle Loss-Moderate muscle mass loss, Temples (temporalis muscle), Clavicles (pectoralis and deltoids)  5. Fluid Accumulation-Mild fluid accumulation, Extremities  6.   Strength-Not measured    Nutrition Risk Level: High    Nutrient Needs:  · Estimated Daily Total Kcal: 1163-9648 kcal/day (20-25 kcal/kg - 91.7 kg on 8/21)  · Estimated Daily Protein (g): 105-121 g/day (1.3-1.5 g/kg - IBW 80.7 kg)  · Estimated Daily Total Fluid (ml/day): per physician    Nutrition Diagnosis:   · Problem: Severe malnutrition, In context of acute illness or injury  · Etiology: related to Insufficient energy/nutrient consumption     Signs and symptoms:  as evidenced by Diet history of poor intake, Moderate muscle loss    Objective Information:  · Nutrition-Focused Physical Findings: Pedro Thakur

## 2019-08-30 NOTE — FLOWSHEET NOTE
IR would like to wait to do biopsy he is the one to do the initial biopsy and would like Dr. Bjorn Schreiber to do it next week

## 2019-09-01 NOTE — PROGRESS NOTES
week.          Chief Complaint: abd pain, diarrhea, cough    Hospital Course: Cynthia Lopes is a 69 y.o. male with CAD with hx CABG, hx DVT, HTN, AF, hx colon cancer, arthritis admitted from [facility] for hypotension with BP to 80's and hypoxia with sats in 80's. Temp was 102.6 on admission. Per Dr. Sheila Iverson note 8/30/19 \"Patient apparently has not been feeling well for the last 6 months and did initially see his PCP and had basic lab work and nothing else was done and patient was discouraged and did not want to go back and was having poor appetite, chronic pain in the right side of the abdomen, that has been gradually worsening and not eating well or drinking well for several months and has lost almost 60 pounds with 10 pounds being in the last 1 week. Is been having diarrhea at least for the last 2 to 3 months, 3-4 episodes of watery stools and has also been having cough for the last 1 month, mostly clear phlegm. Eventually family members took him to the PCPs office and PCP told to go to the emergency room right away and was noticed to have blood pressure in the 80s and subsequently transferred here. Patient also noticed to have A. fib with RVR which is new. Started on IV fluids 30 mL/kg along with amiodarone drip and oral Cardizem, empiric antibiotics and subsequently blood pressure was still on the low side and needed additional boluses. Cardiology was consulted. For the abdominal pain and diarrhea and patient did admit to having some dark stool one time not sure if it was blood.       CT of the abdomen suggested hepatomegaly with extensive metastasis of the liver and incidental finding of aneurysm of the ascending aorta, thickening of the wall of gastric antrum which could be due to peristalsis or gastritis or peptic ulcer disease or neoplasm. Gallstones, enlarged prostrate,Venous Doppler was negative for DVT .   Gastroenterologist was consulted and started on Protonix drip and plan for consulted and kept n.p.o., general surgeon felt that patient had a history of sigmoid colectomy in the past and less likely to be volvulus and recommended GI for decompression as the bowel is dilated around 14 cm, discussed with general surgeon Dr. Slade Nunez, this morning at bedside, repeat KUB, replace electrolytes, the possibility of Buffalo's syndrome cannot be ruled out, patient still wants to continue with the liver biopsy tomorrow      8/26-still has significant pain in the abdomen, could not appreciate much bowel sounds, had NG tube and rectal tube placed  yesterday, 700 mL out, KUB done last midnight shows bowel dilation around 15 cm, repeat x-ray done today pending, potassium 3.6, had liver biopsy done, tolerated well, blood pressure still on the low side, gentle hydration, mild edema, no shortness of breath, will consider Lasix  General surgery recommending-that patient might benefit from endoscopic decompression     8/27 GI did colonoscopy and colonic decompression done last night, and decompression tube was left in the colon, bowel not distended anymore, 4 cm currently, noticed extrinsic compression at the hepatic flexure and splenic flexure, still n.p.o., will get PICC line, TPN, patient not sure about what he wants once biopsy results are back, will get palliative care, awaiting for biopsy results, once TPN started we will stop IV fluids  -Has urinary retention, bladder scan 318 mL,  Ricardo's placed     8/28 bowel sounds good, still has pain in the right-side most likely from malignancy and liver enlargement, on Relistor and neostigmine from 8/26, awaiting for KUB this morning, started TPN yesterday, stop IV fluids, give low-dose Bumex 0.5 mg to help with edema, decreased breath sounds on right posterior inferior, continue pain medications, awaiting for biopsy     8/29-biopsy showed fibroelastic tissue, no obvious malignancy seen, oncology recommending repeat biopsy, yesterday NG tube was removed as obstruction resolved, will remove rectal tube, encourage diet as he tolerated liquids, advance to regular, continue TPN until eating well, will follow with cardiology about long-term anticoagulation, check with radiology about repeat biopsy, given Bumex dose 1 mg IV as edema in the legs little prominent, lungs still sound fairly okay, encourage activity     8/30-Taper off TPN tolerating diet, advance to regular diet soft, diuresed well with Bumex yesterday, still has edema in the legs mainly,  give Bumex 1 mg IV 1 dose, start therapeutic Lovenox as he is stable and no plan for biopsy today, plan for biopsy apparently next week, encourage activity, change Cardizem to once daily dosing 120 mg, decrease metoprolol to 12.5 mg twice daily, because of blood pressure being on low side he was not getting it, for the last 1 week at least, pain management consulted-cannot see until next week, will start extended release morphine sulfate 10 mg twice daily and increase immediate release to 5 mg 3 times daily, bowel regimen, stop Relistor and neostigmine  -transfer to telemetry bed from stepdown  -We will give trial without Ricardo once more active\"    8/31 --> TPN stopped 8/30. Pain better controlled with pain med changes    Subjective:   -- 9/1 --> pt tearful - daughter at bedside - just was told he will probably have to go to SNF bc of his weakness. Continues with right sided abd pain - pain meds help. Hasn't taken Oxy IR since 8/30. No dilaudid since 8/29. Denies cp, sob. Denies n/v. Denies f/c.  Corey po but poor appetitie - drinking the ensures ~ 2 a day. On 1L O2. Afebrile. Not moving much - states his side hurts too much. Last BM 8/30 x 2.       Medications:  Reviewed    Infusion Medications    dextrose       Scheduled Medications    insulin lispro  0-12 Units Subcutaneous TID WC    insulin lispro  0-6 Units Subcutaneous Nightly    diltiazem  120 mg Oral Daily    enoxaparin  1 mg/kg Subcutaneous Q12H   

## 2019-09-02 NOTE — PLAN OF CARE
Problem: Falls - Risk of:  Goal: Will remain free from falls  Description  Will remain free from falls  Outcome: Ongoing  Note:   Remained in bed this shift. Out of bed with 3 assist and karin farfan, PT/OT ordered, did not see today. Bed alarm on, call light in reach, family at bedside through last half of shift. Problem: Risk for Impaired Skin Integrity  Goal: Tissue integrity - skin and mucous membranes  Description  Structural intactness and normal physiological function of skin and  mucous membranes. Outcome: Ongoing  Note:   Declined turn every 2 hours. States he is comfortable at this time. Problem: Discharge Planning:  Goal: Participates in care planning  Description  Participates in care planning  Outcome: Ongoing  Note:   Home with wife prior, TCU at d/c     Problem: Bowel Function - Altered:  Goal: Bowel elimination is within specified parameters  Description  Bowel elimination is within specified parameters  Outcome: Ongoing  Note:   BS active, no BM today. Diarrhea through the night per patient. Problem: Gas Exchange - Impaired:  Goal: Levels of oxygenation will improve  Description  Levels of oxygenation will improve  Outcome: Ongoing  Note:   Remains on 1L NC. Problem: Nutrition  Goal: Optimal nutrition therapy  Description        Outcome: Ongoing  Note:   Appetite is fair. Problem: Pain:  Goal: Pain level will decrease  Description  Pain level will decrease  Outcome: Ongoing  Note:   Patient states pain relief from scheduled pain medication Oxy ER 20 mg q12h. Did not take prn pain medication today. Pain reassessed one hour post PRN pain medication given. Patient rates pain 2 on MINDY 0-10 scale. Pain goal 2. Problem: Serum Glucose Level - Abnormal:  Goal: Ability to maintain appropriate glucose levels will improve  Description  Ability to maintain appropriate glucose levels will improve  Outcome: Ongoing  Note:   Blood sugar covered with lunch.  WNL for breakfast and supper. Problem: Infection - Central Venous Catheter-Associated Bloodstream Infection:  Goal: Will show no infection signs and symptoms  Description  Will show no infection signs and symptoms  Outcome: Ongoing  Note:   Had CHG bath on nights at 3 am for central line. Care plan reviewed with patient and wife. Patient and wife verbalize understanding of the plan of care and contribute to goal setting.

## 2019-09-02 NOTE — PLAN OF CARE
diminished. O2 saturation is 95% on 1L. Problem: Nutrition  Goal: Optimal nutrition therapy  Description  9/2/2019 0222 by Jan Mccormack RN  Outcome: Ongoing  Note:   Patient tolerating a dental soft diet. No nausea or vomiting noted. Problem: Pain:  Goal: Pain level will decrease  Description  Pain level will decrease  9/2/2019 0222 by Jan Mccormack RN  Outcome: Ongoing  Note:   Pain Assessment: 0-10  Pain Level: 4   Pain goal:  2  Is pain goal met at this time? Yes     Additional interventions to be implemented: medications  , position change and rest,and cold. Problem: Serum Glucose Level - Abnormal:  Goal: Ability to maintain appropriate glucose levels will improve  Description  Ability to maintain appropriate glucose levels will improve  9/2/2019 0222 by Jan Mccormack RN  Outcome: Ongoing  Note:   Blood sugar was 115. Patient did not meet criteria for Insulin. Problem: Musculor/Skeletal Functional Status  Goal: Highest potential functional level  9/2/2019 0222 by Jan Mccormack RN  Outcome: Ongoing  Note:   Patient ADLs performed by nursing staff when needed. Other patient ADLs were performed by patient. Patient hygiene was addressed. Patient educated on daily cares and anticipation of future discharge. Patient received Daily CHG bath. Problem: Infection - Central Venous Catheter-Associated Bloodstream Infection:  Goal: Will show no infection signs and symptoms  Description  Will show no infection signs and symptoms  9/2/2019 0222 by Jan Mccormack RN  Outcome: Ongoing  Note:   Patient has a PICC in right basilic. Patient received daily CHG bath. Care plan reviewed with patient . Patient verbalize understanding of the plan of care and contribute to goal setting.

## 2019-09-03 NOTE — FLOWSHEET NOTE
09/03/19 1407   Encounter Summary   Services provided to: Patient   Referral/Consult From: [facility] Spouse   Continue Visiting Yes  (9/3/19)   Complexity of Encounter Moderate   Spiritual/Evangelical   Type Spiritual support   Assessment Anxious   Intervention Nurtured hope   Outcome Expressed gratitude   S:  The 69 yr old patient was Unable to respond but the pts. Family (wife) was actively                       present. I wanted to be with the family and ask if there were any Spiritual   needs. The pt's wife stated that she was anxious waiting on the doctor's report. O:   The patient (wasnt able to respond) and the family was actively present. The patients family stated that they would like prayer. A: I offered emotional support, nurtured hope, provided words of encouragement and a prayer of healing/comfort to the pt. The patients family stated that they were grateful for the support. P:           Continued support would be beneficial to the patient and their family.

## 2019-09-03 NOTE — PROGRESS NOTES
[facility] 5K  Occupational Therapy  Daily Note  Time:    Time In: 4838  Time Out: 1536  Timed Code Treatment Minutes: 28 Minutes  Minutes: 28          Date: 9/3/2019  Patient Name: Pratima Norman,   Gender: male      Room: [facility]020-A  MRN: [id number]  : 1945  (69 y.o.)  Referring Practitioner: Savita PADILLA  Diagnosis: fever  Additional Pertinent Hx: Per ER note on 19:   69 y.o. male who presents to the Emergency Department by EMS from [facility] as and ED to ED transfer. Patient was initially supposed to be a direct admit, but there are no beds available at this time. Wife is at bedside. Patient has a history of colon cancer with mets to the liver. Patient reports weakness, fatigue, dizziness, and decreased appetite over the past 5 to 7 days. He then noticed blood in his stool this morning which progressed throughout the day prompting his visit to [facility]. Patient was found to have new onset A-fib    Restrictions/Precautions:  Restrictions/Precautions: Fall Risk, General Precautions, Up as Tolerated  Position Activity Restriction  Other position/activity restrictions: O2, going for liver biopsy on 19    SUBJECTIVE: max encouragement to participate    PAIN: no number given/10: various c/o pain    COGNITION: Decreased Insight, Decreased Problem Solving, Decreased Safety Awareness, Impaired Attention and Difficulty Following Commands    ADL:   Lower Extremity Dressing: Dependent. for don/doffing slipper socks. **applied depend undergarment while rolling side to side    BALANCE:  Sitting Balance:  Minimal Assistance    BED MOBILITY:  Rolling to Left: Maximum Assistance    Rolling to Right: Maximum Assistance    Supine to Sit: Maximum Assistance x2  Sit to Supine: Dependent, X 2      TRANSFERS:  Sit to Stand:  Maximum Assistance, X 2. attempted x 4 times; Pt only able to achieve 1/4 stand.  Forward flexed posture with head down, max vcs for posture & positioning & elevated bed;      ASSESSMENT:  Activity Tolerance:  Patient tolerance of  treatment: fair. Discharge Recommendations: Continue to assess pending progress  Equipment Recommendations: Other: Monitor pending progress  Plan: Times per week: 5x  Current Treatment Recommendations: Balance Training, Functional Mobility Training, Safety Education & Training, Self-Care / ADL, Strengthening, Cognitive Reorientation, Endurance Training    Patient Education  Patient Education: see above    Goals  Short term goals  Time Frame for Short term goals: 2 weeks  Short term goal 1: Complete sit-stand with min A x 1 from various surfaces for increased ease of toilet t/fs  Short term goal 2: Tolerate standing 1 min with min A x 1 to increase endurance for toileting  Short term goal 3: Complete BUE AROM and light resistance exercises x 10 reps with min RBs to increase endurance for BADL  Short term goal 4: Tolerate further assessment of functional mobility by OTR when appropriate  Long term goals  Time Frame for Long term goals : No LTG set d/t short ELOS    Following session, patient left in safe position with all fall risk precautions in place.

## 2019-09-03 NOTE — PLAN OF CARE
Nutrition Problem: Severe malnutrition, In context of acute illness or injury  Intervention: Food and/or Nutrient Delivery: Continue current diet, Continue current ONS  Nutritional Goals: Patient will consume 75% or more of meals during LOS.     Problem: Nutrition  Goal: Optimal nutrition therapy  Description        9/3/2019 1555 by Flakita Rios RD, LD  Outcome: Ongoing

## 2019-09-03 NOTE — PLAN OF CARE
Problem: Falls - Risk of:  Goal: Will remain free from falls  Description  Will remain free from falls  Outcome: Ongoing  Note:   Patient free from falls this shift. Patient has remained in bed this shift. Patient uses call light appropriately. Problem: Risk for Impaired Skin Integrity  Goal: Tissue integrity - skin and mucous membranes  Description  Structural intactness and normal physiological function of skin and  mucous membranes. Outcome: Ongoing  Note:   Patient has DTI on buttocks. Patient turned and repositioned every two hours and as needed. Problem: Discharge Planning:  Goal: Participates in care planning  Description  Participates in care planning  Outcome: Ongoing  Note:   Unsure of patients plan at discharge. Patient refusing to go to Nursing home at discharge. Problem: Gas Exchange - Impaired:  Goal: Levels of oxygenation will improve  Description  Levels of oxygenation will improve  Outcome: Ongoing  Note:   Patients pulse ox 95% on 1L nasal cannula. Patient doesn't normally wear oxygen at home. Problem: Nutrition  Goal: Optimal nutrition therapy  Description        Outcome: Ongoing  Note:   Patient is on a soft diet. Patient eating small bites of meals. Problem: Pain:  Goal: Pain level will decrease  Description  Pain level will decrease  Outcome: Ongoing  Note:   Pain Assessment: 0-10  Pain Level: 3   Pain goal:  5  Is pain goal met at this time? Yes     Additional interventions to be implemented: position change and rest         Problem: Serum Glucose Level - Abnormal:  Goal: Ability to maintain appropriate glucose levels will improve  Description  Ability to maintain appropriate glucose levels will improve  Outcome: Ongoing  Note:   Glucose levels within normal range.       Problem: Infection - Central Venous Catheter-Associated Bloodstream Infection:  Goal: Will show no infection signs and symptoms  Description  Will show no infection signs and symptoms  Outcome: Ongoing  Note:   No signs or symptoms of infection noted around PICC line. Care plan reviewed with patient. Patient verbalizes understanding of the plan of care and contribute to goal setting.

## 2019-09-03 NOTE — PLAN OF CARE
Glucose Level - Abnormal:  Goal: Ability to maintain appropriate glucose levels will improve  Description  Ability to maintain appropriate glucose levels will improve  9/3/2019 1614 by Crystal Thakkar RN  Note:   Blood sugar checks d/c today per hospitalist as blood sugars have been decent and not requiring insulin often. Problem: Infection - Central Venous Catheter-Associated Bloodstream Infection:  Goal: Will show no infection signs and symptoms  Description  Will show no infection signs and symptoms  9/3/2019 1614 by Crystal Thakkar RN  Note:   Dressing changed today per IV team.      Problem: Anxiety/Stress:  Intervention: Assess anxiety characteristics  Note:   Patient with anxiety upon trying to stand up out of bed and with watery stools this morning, consolable. Problem: Skin Integrity - Impaired:  Intervention: Repositioning  Note:   Turning patient every 2 hours as tolerated with pillow support. EPC to buttocks. Care plan reviewed with patient and wife. Patient and wife verbalize understanding of the plan of care and contributes to goal setting.

## 2019-09-03 NOTE — PROGRESS NOTES
Assessment and Plan:        1. Liver masses; first bx nondiagnostic;; ordered second. Still having lots of pain; boosted his oxycontin. metastastes suspected. Very high AFP  2. Afib/rvr- on anticoagulation- will hold pm dose  3. Chronic diarrhea- infectious sources neg- will try to treat preventively        CC:  Admitted with fever, abdominal pain. Hx colon ca  HPI: continues to have pain. Having loose stools  ROS (12 point review of systems completed. Pertinent positives noted.  Otherwise ROS is negative) :   PMH:  Per HPI  SHX:  , former smoker  FHX: unk  Allergies: See above    Medications:     dextrose        psyllium  1 packet Oral Daily    loperamide  2 mg Oral 4x Daily    enoxaparin  1 mg/kg Subcutaneous Once    pantoprazole  40 mg Oral BID AC    potassium replacement protocol   Other RX Placeholder    magnesium replacement protocol   Other RX Placeholder    diltiazem  180 mg Oral Daily    oxyCODONE  20 mg Oral 2 times per day    insulin lispro  0-12 Units Subcutaneous TID WC    insulin lispro  0-6 Units Subcutaneous Nightly    metoprolol tartrate  12.5 mg Oral BID    phosphorus replacement protocol   Other RX Placeholder    lidocaine 1 % injection  5 mL Intradermal Once    sodium chloride flush  10 mL Intravenous 2 times per day    midodrine  10 mg Oral TID WC       Vital Signs:   BP (!) 129/56   Pulse 90   Temp 98.3 °F (36.8 °C) (Axillary)   Resp 16   Ht 6' (1.829 m)   Wt 230 lb 14.4 oz (104.7 kg)   SpO2 96%   BMI 31.32 kg/m²      Intake/Output Summary (Last 24 hours) at 9/3/2019 1048  Last data filed at 9/3/2019 0853  Gross per 24 hour   Intake 393 ml   Output 625 ml   Net -232 ml        Physical Examination: General appearance - chronically ill appearing  Mental status - alert, oriented to person, place, and time  Neck - supple, no significant adenopathy, no JVD, or carotid bruits  Chest - clear to auscultation, no wheezes, rales or rhonchi, symmetric air entry  Heart - irregularly irregular rhythm with rate 90  Abdomen - tenderness noted ruq    Neurological - alert, oriented, normal speech, no focal findings or movement disorder noted  Musculoskeletal - no muscular tenderness noted  Extremities - peripheral pulses normal, no pedal edema, no clubbing or cyanosis  Skin - normal coloration and turgor, no rashes, no suspicious skin lesions noted    Data: (All radiographs, tracings, PFTs, and imaging are personally viewed and interpreted unless otherwise noted).           Electronically signed by Roslyn Najjar, MD on 9/3/2019 at 10:48 AM

## 2019-09-04 NOTE — DISCHARGE INSTR - COC
quadrant abdominal pain R10.31    Physical deconditioning R53.81    AAA (abdominal aortic aneurysm) without rupture (HCC) I71.4    Macrocytic anemia D53.9       Isolation/Infection:   Isolation          No Isolation            Nurse Assessment:  Last Vital Signs: /67   Pulse 100   Temp 98.3 °F (36.8 °C) (Axillary)   Resp 18   Ht 6' (1.829 m)   Wt 230 lb 14.4 oz (104.7 kg)   SpO2 94%   BMI 31.32 kg/m²     Last documented pain score (0-10 scale): Pain Level: 3  Last Weight:   Wt Readings from Last 1 Encounters:   08/30/19 230 lb 14.4 oz (104.7 kg)     Mental Status:  oriented    IV Access:  - None    Nursing Mobility/ADLs:  Walking   Dependent  Transfer  Dependent  Bathing  Dependent  Dressing  Dependent  Toileting  Dependent  Feeding  Independent  Med Admin  Independent  Med Delivery   whole    Wound Care Documentation and Therapy:  Wound 08/21/19 Buttocks Mid;Right purple (Active)   Wound Deep tissue/Injury 9/3/2019  7:45 PM   Dressing Status Intact;Dry;Clean 9/2/2019  4:14 PM   Dressing Changed Other (Comment) 9/2/2019  4:14 PM   Dressing/Treatment Protective barrier;Open to air 9/4/2019  8:15 AM   Wound Cleansed Other (Comment) 8/29/2019  8:15 PM   Wound Assessment Dry; Intact; Clean 9/2/2019  4:14 PM   Drainage Amount None 9/2/2019  4:14 PM   Odor None 9/2/2019  4:14 PM   Krystyna-wound Assessment Clean;Dry; Intact 9/2/2019  4:14 PM   Number of days: 14        Elimination:  Continence:   · Bowel: No  · Bladder: catheter  Urinary Catheter: Insertion Date: 8/27/19   Colostomy/Ileostomy/Ileal Conduit: No  Rectal Tube With balloon-Stool Appearance: Watery  Rectal Tube With balloon-Stool Color: Brown  Rectal Tube With balloon-Stool Amount: Large    Date of Last BM: 9/5/19    Intake/Output Summary (Last 24 hours) at 9/4/2019 1506  Last data filed at 9/4/2019 1333  Gross per 24 hour   Intake 210 ml   Output 550 ml   Net -340 ml     I/O last 3 completed shifts: In: 210 [P.O.:200;  I.V.:10]  Out: 550 [Urine:550]    Safety Concerns: At Risk for Falls    Impairments/Disabilities:      None    Nutrition Therapy:  Current Nutrition Therapy:   - Oral Diet:  Dental Soft    Routes of Feeding: Oral  Liquids: No Restrictions  Daily Fluid Restriction: no  Last Modified Barium Swallow with Video (Video Swallowing Test): not done    Treatments at the Time of Hospital Discharge:   Respiratory Treatments: none  Oxygen Therapy:  is on oxygen at 1 L/min per nasal cannula. Ventilator:    - No ventilator support    Rehab Therapies: Physical Therapy  Weight Bearing Status/Restrictions: No weight bearing restirctions  Other Medical Equipment (for information only, NOT a DME order):  walker  Other Treatments:     Patient's personal belongings (please select all that are sent with patient):  Glasses    RN SIGNATURE:  Electronically signed by Zayda Lilly RN on 9/6/19 at 8:33 AM    CASE MANAGEMENT/SOCIAL WORK SECTION    Inpatient Status Date: 8/20/19    Readmission Risk Assessment Score:  Readmission Risk              Risk of Unplanned Readmission:        21           Discharging to Facility/ Agency   · Name: [facility]  · Address: [address] [place] [address]  · Phone: [phone]  · Fax: [phone]    Dialysis Facility (if applicable)   · Name:  · Address:  · Dialysis Schedule:  · Phone:  · Fax:    / signature: Electronically signed by ADIS Thurston on 9/4/19 at 3:08 PM    PHYSICIAN SECTION     Prognosis: Fair    Condition at Discharge: Stable    Rehab Potential (if transferring to Rehab): Fair    Recommended Labs or Other Treatments After Discharge: f/u Dr. Oneida Zaragoza 7/02    Physician Certification: I certify the above information and transfer of Nissa Singh  is necessary for the continuing treatment of the diagnosis listed and that he requires [facility] for less 30 days.      Update Admission H&P: No change in H&P    PHYSICIAN SIGNATURE:  Electronically signed

## 2019-09-04 NOTE — CONSULTS
Imodium PRN; no wounds; poor appetite for months per pt report. · Wound Type: None  · Current Nutrition Therapies:  · Oral Diet Orders: Clear Liquid   · Oral Diet intake: %(pt ate jello & broth yesterday per pt report)  · Oral Nutrition Supplement (ONS) Orders: Clear Liquid Oral Supplement(Ensure Clear TID; Will send Chocolate Ensure Enlive TID when diet is advanced)  · ONS intake: (Initiated today)  · Anthropometric Measures:  · Ht: 6' (182.9 cm)   · Current Body Wt: 202 lb 3.2 oz (91.7 kg)(8/21; +trace edema BLE)  · Admission Body Wt: 201 lb 4.5 oz (91.3 kg)(8/20; +trace edema BLE)  · Usual Body Wt: 210 lb (95.3 kg)(per pt report x1 week ago. No weight hx per EMR)  · % Weight Change:  -3.8% weight loss in one week per pt report- no weight hx per EMR to confirm reported weight loss  · Ideal Body Wt: 178 lb (80.7 kg), % Ideal Body 113%  · BMI Classification: BMI 25.0 - 29.9 Overweight(27.5)    Nutrition Interventions:   Continue current diet, Start ONS, Vitamin Supplement(Advance diet as tolerated. Recommend a Multivitamin w/minerals daily.  Started Ensure Clear TID.)  Continued Inpatient Monitoring, Education Initiated, Coordination of Care(Encouraged po intake at best effort)    Nutrition Evaluation:   · Evaluation: Goals set   · Goals: Pt will recieive adequate nutrition within 1-4 days    · Monitoring: Nutrition Progression, Meal Intake, Supplement Intake, Diet Tolerance, Skin Integrity, Weight, Pertinent Labs, Monitor Bowel Function    Electronically signed by Jimbo Solis RD, LD on 8/21/19 at 10:50 AM    Contact Number: [phone]
No headaches or seizures, numbness or tingling of arms, or legs. PSYCH:  No anxiety or depression. ENDOCRINE:   No polyuria or polydipsia. BLOOD:  Slight anemia      PHYSICAL EXAM:  BP (!) 81/49   Pulse 76   Temp 97.5 °F (36.4 °C) (Oral)   Resp 26   Ht 6' (1.829 m)   Wt 201 lb 4.5 oz (91.3 kg)   SpO2 98%   BMI 27.30 kg/m²     General appearance: Tired, tearful at times  HEENT: Normal cephalic, atraumatic without obvious deformity. Pupils equal, round, and reactive to light. Neck: Supple, with full range of motion. No jugular venous distention. Trachea midline. Respiratory:  Normal respiratory effort. Clear to auscultation, bilaterally without Rales/Wheezes/Rhonchi. Cardiovascular: Afib with no murmurs, rubs, or gallops  Abdomen: Soft, non-tender, non-distended with normal bowel sounds. Musculoskeletal: No clubbing, cyanosis or edema bilaterally. Skin: Pale, No rashes or lesions. Psychiatric: Alert and oriented x 3    Labs:      Ref. Range 8/20/2019 00:55   Sodium Latest Ref Range: 135 - 145 meq/L 134 (L)   Potassium Latest Ref Range: 3.5 - 5.2 meq/L 3.5   Chloride Latest Ref Range: 98 - 111 meq/L 98   CO2 Latest Ref Range: 23 - 33 meq/L 19 (L)   BUN Latest Ref Range: 7 - 22 mg/dL 21   Creatinine Latest Ref Range: 0.4 - 1.2 mg/dL 0.6      Ref. Range 8/20/2019 00:55   Troponin T Latest Units: ng/ml < 0.010   Albumin Latest Ref Range: 3.5 - 5.1 g/dL 2.7 (L)   Alk Phos Latest Ref Range: 38 - 126 U/L 86   ALT Latest Ref Range: 11 - 66 U/L 7 (L)   AST Latest Ref Range: 5 - 40 U/L 39   Bilirubin Latest Ref Range: 0.3 - 1.2 mg/dL 0.7   Bilirubin, Direct Latest Ref Range: 0.0 - 0.3 mg/dL 0.4 (H)   Lipase Latest Ref Range: 5.6 - 51.3 U/L 39.2   Total Protein Latest Ref Range: 6.1 - 8.0 g/dL 6.8      Ref.  Range 8/20/2019 00:55   WBC Latest Ref Range: 4.8 - 10.8 thou/mm3 4.2 (L)   RBC Latest Ref Range: 4.70 - 6.10 mill/mm3 3.61 (L)   Hemoglobin Quant Latest Ref Range: 14.0 - 18.0 gm/dl 11.3 (L)
SURGERY      CORONARY ARTERY BYPASS GRAFT         Allergies:    No Known Allergies     Current Medications:   Current Facility-Administered Medications: 0.45 % sodium chloride infusion, , Intravenous, Continuous  fentaNYL (SUBLIMAZE) injection 50 mcg, 50 mcg, Intravenous, Once  midazolam (VERSED) injection 1 mg, 1 mg, Intravenous, Once  psyllium (KONSYL) 28.3 % packet 1 packet, 1 packet, Oral, BID  metoprolol tartrate (LOPRESSOR) tablet 25 mg, 25 mg, Oral, BID  loperamide (IMODIUM) capsule 2 mg, 2 mg, Oral, 4x Daily  pantoprazole (PROTONIX) tablet 40 mg, 40 mg, Oral, BID AC  potassium replacement protocol, , Other, RX Placeholder  magnesium replacement protocol, , Other, RX Placeholder  diltiazem (CARDIZEM CD) extended release capsule 180 mg, 180 mg, Oral, Daily  oxyCODONE (OXYCONTIN) extended release tablet 20 mg, 20 mg, Oral, 2 times per day  oxyCODONE (ROXICODONE) immediate release tablet 5 mg, 5 mg, Oral, Q3H PRN  phosphorus replacement protocol, , Other, RX Placeholder  lidocaine PF 1 % injection 5 mL, 5 mL, Intradermal, Once  sodium chloride flush 0.9 % injection 10 mL, 10 mL, Intravenous, 2 times per day  sodium chloride flush 0.9 % injection 10 mL, 10 mL, Intravenous, PRN  glucose (GLUTOSE) 40 % oral gel 15 g, 15 g, Oral, PRN  dextrose 50 % IV solution, 12.5 g, Intravenous, PRN  glucagon (rDNA) injection 1 mg, 1 mg, Intramuscular, PRN  dextrose 5 % solution, 100 mL/hr, Intravenous, PRN  midodrine (PROAMATINE) tablet 10 mg, 10 mg, Oral, TID WC  acetaminophen (TYLENOL) tablet 650 mg, 650 mg, Oral, Q4H PRN  HYDROmorphone (DILAUDID) injection 0.5 mg, 0.5 mg, Intravenous, Q4H PRN  ondansetron (ZOFRAN) injection 4 mg, 4 mg, Intravenous, Q6H PRN    Social History:  Social History     Socioeconomic History    Marital status:      Spouse name: Not on file    Number of children: Not on file    Years of education: Not on file    Highest education level: Not on file   Occupational History    Not on file
syncope. No recent trauma. No bleeding  problem. No HEENT-related problems. Diffuse arthritic and  musculoskeletal problems. PAST MEDICAL HISTORY:  1. Coronary artery disease. 2.  Hypertension. 3.  Hyperlipidemia. 4.  Previous history of colon polyps. ALLERGIES:  No known drug allergies. MEDICATIONS:  Currently, the patient is on IV amiodarone due to his low  blood pressure to control his heart rate as well as low dose metoprolol. SOCIAL HISTORY:  History of smoking. No alcohol or drug abuse. FAMILY HISTORY:  Significant for coronary artery disease. PHYSICAL EXAMINATION:  VITAL SIGNS:  Showed blood pressure of 105/70, heart rate of 100. GENERAL APPEARANCE:  Pleasant gentleman, pale, in no obvious distress. NECK:  Mild JVD. LUNGS:  Decreased air entry. No crackles. No wheezes. HEART:  Normal S1, S2. Systolic murmur, grade 2/6. ABDOMEN:  Soft, nontender. Positive bowel sounds. No organomegaly. EXTREMITIES:  Mild edema. NEUROLOGIC:  Remains grossly intact. Awake, alert. No focal deficits. PSYCH:  No evidence of active psychosis. SKIN:  No rashes. LABORATORY DATA AND IMAGING:  Showed sodium 136, potassium 3, BUN 16,  creatinine 0.6. White count 3.9, hemoglobin 10.3, hematocrit 33.1, and  platelets 498. EKG showed AFib, diffuse nonspecific ST-T wave changes. IMPRESSION:  This is a gentleman who comes in with the above  presentation. The patient is likely dealing with a systemic disease or  malignancy and AFib is likely secondary; however, I cannot exclude  congestive heart failure as a cause. The patient seems to be quite  frail, seems to have lost quite a bit of weight, and there is definitely  a concern about either depression or malignancy going on. The patient  does have Hemoccult stools and has a history of colon polyps. PLAN:  My recommendations are as follows:  1. The patient certainly needs GI workup. 2.  Rate control for now.   3.  Given his GI bleed, we
abdomen  and pelvis is also seen. He had a small right pleural effusion and  possible tiny left pleural effusion or pleural thickening. Right lobe  atelectasis cannot exclude pneumonia. The patient also underwent  ultrasound Doppler study of bilateral lower extremities, which did not  reveal any evidence of deep venous thrombosis bilaterally. PLAN:  The patient will be further evaluated with CT scan of the chest,  full body bone scan, and CT-guided biopsy of the liver lesion. The  patient will also obtain serum CEA level, PSA, and AFP levels. Depending on the patient's clinical course and results of the workup,  further management will be planned. Thank you, Ney Hutton PA-C, for the interesting consult on this  pleasant gentleman with multiple medical problems. Please do not  hesitate to call with any questions or concerns if I can be of any  further assistance.         Aylin Stiles M.D.    D: 08/22/2019 18:23:03       T: 08/22/2019 20:10:19     [email or web address]  Job#: [id number]     Doc#: [id number]    CC:

## 2019-09-04 NOTE — PLAN OF CARE
with repositioning. Patient satisfied with current pain relief interventions. Patient's stated pain goal is a 3/10. Patient's pain has been staying around a 3-4 on a scale of 10. Will continue to monitor. Care plan reviewed with patient and wife. Patient and wife verbalize understanding of the plan of care and contribute to goal setting.

## 2019-09-04 NOTE — PLAN OF CARE
Problem: Falls - Risk of:  Goal: Will remain free from falls  Description  Will remain free from falls  9/4/2019 0219 by Sourav Garrido RN  Outcome: Ongoing  Note:   No falls noted this shift. Maximum assistance with repositioning in bed. Refused turning at times. Bed in low position with bed alarm on. 2/4 side rails up. Call light within reach. Hourly rounding. Problem: Risk for Impaired Skin Integrity  Goal: Tissue integrity - skin and mucous membranes  Description  Structural intactness and normal physiological function of skin and  mucous membranes. Outcome: Ongoing  Note:   DTI to buttocks, epc applied. Swelling in lower extremities and sacral area. Repositioned q2h with pillow support. Heels elevated off bed. Problem: Discharge Planning:  Goal: Participates in care planning  Description  Participates in care planning  9/4/2019 0219 by Sourav Garrido RN  Outcome: Ongoing  Note:   Planning ECF in [facility] area. Will monitor for discharge needs. Problem: Gas Exchange - Impaired:  Goal: Levels of oxygenation will improve  Description  Levels of oxygenation will improve  9/4/2019 0219 by Sourav Garrido RN  Outcome: Ongoing  Note:   O2 > 90% on 1 L NC. Rhonci and diminished lung sounds. Problem: Nutrition  Goal: Optimal nutrition therapy  Description        9/4/2019 0219 by Sourav Garrido RN  Outcome: Ongoing  Note:   Tolerating dental soft diet well. Supplemental nutrition. Problem: Pain:  Goal: Pain level will decrease  Description  Pain level will decrease  9/4/2019 0219 by Sourav Garrido RN  Outcome: Ongoing  Note:   Stated pain goal 3-4 on 0-10 scale. Patient states acute pain in right hip that is achy at 8/10. Pain management following. Oxy ER scheduled. Oxy IR PRN. Ice packs to right hip and repositioning with pillow support. Patient satisfied.       Problem: Musculor/Skeletal Functional Status  Goal: Highest potential

## 2019-09-04 NOTE — PROGRESS NOTES
Discussed with Dr. Lise Felix. Met with patient and wife. Liver biopsy to be done tomorrow. They are aware that further plan of care can be discussed once liver biopsy is resulted. Patient states \"I feel so bad I'm not sure I want to live\". He says he is ok with current pain regimen and it is effective. Encouraged him to tell staff if his pain isn't controlled so we can make changes if needed. Wife tearful throughout visit. Much support and encouragement provided to wife and patient. Will follow up with patient and wife tomorrow.

## 2019-09-04 NOTE — PROGRESS NOTES
Assessment and Plan:        1. Liver masses; first bx nondiagnostic;; ordered second. Still having lots of pain; boosted his oxycontin. metastastes suspected. Very high AFP  2. Afib/rvr- on anticoagulation- will hold for bx  3. Chronic diarrhea- infectious sources neg- will try to treat preventively        CC:  Admitted with fever, abdominal pain. Hx colon ca  HPI: continues to have pain. Having loose stools  ROS (12 point review of systems completed. Pertinent positives noted.  Otherwise ROS is negative) :   PMH:  Per HPI  SHX:  , former smoker  FHX: unk  Allergies: See above    Medications:     sodium chloride      dextrose        fentanNYL  50 mcg Intravenous Once    midazolam  1 mg Intravenous Once    psyllium  1 packet Oral Daily    loperamide  2 mg Oral 4x Daily    pantoprazole  40 mg Oral BID AC    potassium replacement protocol   Other RX Placeholder    magnesium replacement protocol   Other RX Placeholder    diltiazem  180 mg Oral Daily    oxyCODONE  20 mg Oral 2 times per day    metoprolol tartrate  12.5 mg Oral BID    phosphorus replacement protocol   Other RX Placeholder    lidocaine 1 % injection  5 mL Intradermal Once    sodium chloride flush  10 mL Intravenous 2 times per day    midodrine  10 mg Oral TID WC       Vital Signs:   /67   Pulse 100   Temp 98.3 °F (36.8 °C) (Axillary)   Resp 18   Ht 6' (1.829 m)   Wt 230 lb 14.4 oz (104.7 kg)   SpO2 94%   BMI 31.32 kg/m²      Intake/Output Summary (Last 24 hours) at 9/4/2019 1010  Last data filed at 9/4/2019 0650  Gross per 24 hour   Intake 450 ml   Output 650 ml   Net -200 ml        Physical Examination: General appearance - chronically ill appearing  Mental status - alert, oriented to person, place, and time  Neck - supple, no significant adenopathy, no JVD, or carotid bruits  Chest - clear to auscultation, no wheezes, rales or rhonchi, symmetric air entry  Heart - irregularly irregular rhythm with rate 90  Abdomen - tenderness noted ruq    Neurological - alert, oriented, normal speech, no focal findings or movement disorder noted  Musculoskeletal - no muscular tenderness noted  Extremities - peripheral pulses normal, no pedal edema, no clubbing or cyanosis  Skin - normal coloration and turgor, no rashes, no suspicious skin lesions noted    Data: (All radiographs, tracings, PFTs, and imaging are personally viewed and interpreted unless otherwise noted).           Electronically signed by Anil Ramírez MD on 9/4/2019 at 10:10 AM

## 2019-09-04 NOTE — PROGRESS NOTES
ACTIVITIES:  Completed B shoulder flexion to 90 degrees x 5 reps 1 UE at a time, while Pt was supine. B scapula elevation & depression x 10 reps while seated EOB. ASSESSMENT:  Activity Tolerance:  Patient tolerance of  treatment: fair. Discharge Recommendations: Continue to assess pending progress  Equipment Recommendations: Other: Monitor pending progress  Plan: Times per week: 5x  Current Treatment Recommendations: Balance Training, Functional Mobility Training, Safety Education & Training, Self-Care / ADL, Strengthening, Cognitive Reorientation, Endurance Training    Patient Education  Patient Education: see above    Goals  Short term goals  Time Frame for Short term goals: 2 weeks  Short term goal 1: Complete sit-stand with min A x 1 from various surfaces for increased ease of toilet t/fs  Short term goal 2: Tolerate standing 1 min with min A x 1 to increase endurance for toileting  Short term goal 3: Complete BUE AROM and light resistance exercises x 10 reps with min RBs to increase endurance for BADL  Short term goal 4: Tolerate further assessment of functional mobility by OTR when appropriate  Long term goals  Time Frame for Long term goals : No LTG set d/t short ELOS    Revised Short-Term Goals  Short term goals  Time Frame for Short term goals: 2 weeks  Short term goal 1: Complete sit-stand with min A x 1 from various surfaces for increased ease of toilet t/fs  Short term goal 2: Tolerate standing 1 min with min A x 1 to increase endurance for toileting  Short term goal 3: Complete BUE AROM and light resistance exercises x 10 reps with min RBs to increase endurance for BADL  Short term goal 4: Tolerate sitting EOB 8-10 min with S & 0-2 vcs for posture to increase balance & safety for EOB ADL tasks  Short term goal 5:  Tolerate further assessment of functional mobility by OTR when appropriate  Long term goals  Time Frame for Long term goals : No LTG set d/t short ELOS      Following session,

## 2019-09-05 NOTE — PROGRESS NOTES
[facility] will come at 0930 tomorrow to  patient. Wife, Leonid Cabrera and Dr. Alfreda Palacios notified.

## 2019-09-05 NOTE — CARE COORDINATION
08/26/19 7:21 AM    Pt transferred to 4A17. Handoff report given to Jerod Sotelo, 4A CM.
8/21/19, 2:47 PM      Anitra Kelsey day: 1  Location: -03/003-A Reason for admit: Fever [R50.9]   Procedure:   8/20 BLE Venous dopplers: Neg for dvt  8/20 US Liver/abdomen/pelvis:   Marked hepatomegaly with multiple liver masses consistent with metastatic disease. Antegrade flow in the hepatic and portal veins. Pancreas obscured by bowel gas. Multiple gallstones in the gallbladder with mild gallbladder wall thickening. No biliary dilatation     Treatment Plan of Care: Oncology consulted for history of colon cancer with US liver showing metastatic disease. Cardiology consulted for afib RVR. PT/OT. Plan for endoscopic procedures once stable. Clear liquid diet. Hospitalist and GI following. Afib 110-120's. Afebrile. On room air. Ox4. Dietitian following. Amio @ 0.5 mg/min, protonix @ 8 mg/hr, IV digoxin x1, prn immodium, toprol xl, IV zosyn, K+ replacement protocol. K+ 3, wbc 3.9, Hgb 10. PCP: Lizz Schultz DO  Readmission Risk Score: 13%  Discharge Plan: Home with wife. Monitor PT/OT evals for possible needs.
8/27/19, 2:26 PM      [facility] day: 7  Location: -17/017-A Reason for admit: Fever [R50.9]   Procedure: CXR    Impression:         New right upper extremity PICC line, tip in the region of the superior cavoatrial junction in satisfactory position. Mild interstitial pulmonary edema. Small bilateral pleural effusions. Right basilar atelectasis. KUB   Impression:          1. An enteric tube and colonic tube appear in similar position with the distal aspect of the rectal tube demonstrating coiling on itself at the splenic flexure. 2. There is a nonspecific, nonobstructive bowel gas pattern. Gaseous distention of the cecum has improved compared to the prior exam and measures approximately 4.0 cm. Treatment Plan of Care: PICC line placement, Palliative Care Eval, TPN to be initiated. NG tube and rectal tube maintenance.   PCP: Scotty Canavan, DO  Readmission Risk Score: 18%  Discharge Plan: Home with wife
9/5/19, 1:45 PM    DISCHARGE BARRIERS    Received a call from [facility] that pre-cert has been approved. CM updated MD to determine if discharge for today or tomorrow.
9/5/19, 3:30 PM    Discharge plan discussed by  and . Discharge plan reviewed with patient/ family. Patient/ family verbalize understanding of discharge plan and are in agreement with plan. Understanding was demonstrated using the teach back method. Services After Discharge  Services At/After Discharge: Nursing Services, Skilled Therapy, In ambulance(Dorie Soliman)   IMM Letter  IMM Letter given to Patient/Family/Significant other/Guardian/POA/by[de-identified] updated  IMM Letter date given[de-identified] 09/05/19  IMM Letter time given[de-identified] 12     Spoke with wife on the phone to update on plan for discharge. She stated that she wanted to talk with MD.  She spoke with provider and he plans for discharge. Medicare rights was given with option to appeal.  Faxed [facility] for transport and they are attempting to find transport prior to 8 pm.  RN was updated and will follow up with [facility]. Facility is aware of possible discharge and information placed on the chart with discharge instructions.
Discharge orders on chart. Met with patient's wife, Dilcia Brush, present at bedside. Dilcia Brush states she does not understand why we are discharging  Christine Liner today when we don't know what is wrong with him since they only got the biopsy today (he is hospital day 16). She feels Christine Liner should remain in the hospital until his biopsy results are back. Explained to Dilcia Brush that we do not keep patient's in the hospital until biopsy results are back. Explained we have to be providing care that can not be done at a lower level of care in order to meet guidelines as established by Medicare. Explained Medicare Rights and the appeal process. Understanding verbalized. Medicare Rights updated and copy delivered to Dilcia Brush.  Electronically signed by Behzad Mascorro RN on 9/5/19 at 3:00 PM
unsure of discharge needs. Pt will need PT/OT eval eventually.  Evaluation: not at this time.

## 2019-09-05 NOTE — PROGRESS NOTES
Patient resting in bed with eyes closed; did not disturb. Wife not present. Patient had liver biopsy done this am.  Palliative care will follow.

## 2019-09-05 NOTE — PROGRESS NOTES
0817 Pt in specials holding for CT guided liver biopsy. Explained procedure to pt and pt verbalizes understanding. Wife with pt.  1 Dr Rocío Crawford to speak to pt and assess pt. Consent signed. 0829 Pt positioned on scanner in supine positioned. Attached to monitor and pre biopsy scans taken. 0848 IV contrast given per CT tech and Dr Rocío Crawford to examine pre scans. 0900 Pathologist present. First core biopsy sample obtained. 3865 2 Additional samples obtained. 6369 2 more additional samples obtained. 6445 Post scan taken. 3934 Biopsy complete. Avitene slurry injected as needle removed per Dr Rocío Crawford.

## 2019-09-05 NOTE — H&P
[address] [address]  Sedation/Analgesia History & Physical    Pt Name: Hung Foster  MRN: [id number]  YOB: 1945  Provider Performing Procedure: Kat You MD  Primary Care Physician: Hyacinth Goff DO    PRE-PROCEDURE   DNR-CCA/DNR-CC []Yes [x]No  Brief History/Pre-Procedure Diagnosis: Liver mass          MEDICAL HISTORY  []CAD/Valve  []Liver Disease  []Lung Disease []Diabetes  []Hypertension []Renal Disease  []Additional information:       has a past medical history of Arthritis, Atrial fibrillation ([facility] [address] DVT (deep venous thrombosis) ([facility] [address] and Hypertension. SURGICAL HISTORY   has a past surgical history that includes Colon surgery and Coronary artery bypass graft.   Additional information:       ALLERGIES   Allergies as of 08/20/2019    (No Known Allergies)     Additional information:       MEDICATIONS   Coumadin Use Last 5 Days [x]No []Yes  Antiplatelet drug therapy use last 5 days  [x]No []Yes  Other anticoagulant use last 5 days  [x]No []Yes    Current Facility-Administered Medications:     fentaNYL (SUBLIMAZE) injection 50 mcg, 50 mcg, Intravenous, Once, Kat You MD    midazolam (VERSED) injection 1 mg, 1 mg, Intravenous, Once, Kat You MD    0.45 % sodium chloride infusion, , Intravenous, Continuous, Kat You MD    fentaNYL (SUBLIMAZE) injection 50 mcg, 50 mcg, Intravenous, Once, Kat You MD    midazolam (VERSED) injection 1 mg, 1 mg, Intravenous, Once, Kat You MD    psyllium (KONSYL) 28.3 % packet 1 packet, 1 packet, Oral, BID, Riri Muse MD, 1 packet at 09/04/19 2210    metoprolol tartrate (LOPRESSOR) tablet 25 mg, 25 mg, Oral, BID, Riri Muse MD, 25 mg at 09/04/19 2209    oxyCODONE (ROXICODONE) immediate release tablet 5 mg, 5 mg, Oral, Q4H PRN, CHUYITA Michelle - CNP, 5 mg at 09/05/19 5055    loperamide (IMODIUM) capsule 2 mg, 2 mg, Oral, 4x Daily, Riri Muse MD, 2 mg at 09/04/19 sedation/procedure plan, risks, and expectations with patient and/or responsible adult completed. [x]Patient examined immediately prior to the procedure.  (Refer to nursing sedation/analgesia documentation record)    Catherine Paredes MD  Electronically signed 9/5/2019 at 8:30 AM

## 2019-09-05 NOTE — DISCHARGE SUMMARY
palliative care. We left the meeting with her continuing being angry, not being interested in the concept of  optimal use of hospital resources. Exam:     Vitals:  Vitals:    09/05/19 0910 09/05/19 0915 09/05/19 0945 09/05/19 1215   BP: 103/60 (!) 103/57 100/66 104/68   Pulse: 102 103 102 100   Resp: 19 17 22    Temp:   97.5 °F (36.4 °C)    TempSrc:   Axillary    SpO2: 93% 90% 92%    Weight:       Height:         Weight: Weight: 230 lb 14.4 oz (104.7 kg)     24 hour intake/output:    Intake/Output Summary (Last 24 hours) at 9/5/2019 1535  Last data filed at 9/4/2019 2254  Gross per 24 hour   Intake 240 ml   Output 250 ml   Net -10 ml         General appearance: Chronically ill appearing    HEENT:  Normal cephalic, atraumatic without obvious deformity. Pupils equal, round, and reactive to light. Extra ocular muscles intact. Conjunctivae/corneas clear. Neck: Supple, with full range of motion. No jugular venous distention. Trachea midline. Respiratory:  Normal respiratory effort. Clear to auscultation, bilaterally without Rales/Wheezes/Rhonchi. Cardiovascular:  Regular rate and rhythm with normal S1/S2 without murmurs, rubs or gallops. Abdomen:  soft. Musculoskeletal:  Legs puffy  Skin: Skin color, texture, turgor normal.  No rashes or lesions. Neurologic:  Neurovascularly intact without any focal sensory/motor deficits. Cranial nerves: II-XII intact, grossly non-focal.  Psychiatric:  Alert and oriented, thought content appropriate, normal insight      Labs:  For convenience and continuity at follow-up the following most recent labs are provided:      CBC:    Lab Results   Component Value Date    WBC 4.2 09/04/2019    HGB 9.8 09/04/2019    HCT 32.7 09/04/2019     09/04/2019       Renal:    Lab Results   Component Value Date     09/02/2019    K 3.7 09/03/2019     09/02/2019    CO2 24 09/02/2019    BUN 12 09/02/2019    CREATININE 0.4 09/04/2019    CALCIUM 9.4 09/02/2019    PHOS 2.8 08/30/2019         Significant Diagnostic Studies    Radiology:   CT GUIDED NEEDLE PLACEMENT   Final Result   Status post CT-guided liver mass biopsy. **This report has been created using voice recognition software. It may contain minor errors which are inherent in voice recognition technology. **      Final report electronically signed by Dr Sheila Romano on 9/5/2019 10:44 AM      CT Needle Biopsy Liver Percutaneous   Final Result   Status post CT-guided liver mass biopsy. **This report has been created using voice recognition software. It may contain minor errors which are inherent in voice recognition technology. **      Final report electronically signed by Dr Sheila Romano on 9/5/2019 10:44 AM      RADIOLOGY REPORT   Final Result      US HEAD NECK SOFT TISSUE THYROID   Final Result   Solitary lesion left thyroid lobe which is low suspicion. Unremarkable ultrasound thyroid otherwise. Consider a one-year follow-up. **This report has been created using voice recognition software. It may contain minor errors which are inherent in voice recognition technology. **      Final report electronically signed by Dr. Shanna Almanza on 8/28/2019 4:45 PM      XR ABDOMEN (KUB) (SINGLE AP VIEW)   Final Result      1. Stable position and appearance of a colonic tube which is coiled on itself at the region of the splenic flexure. The tip is again noted to extend into the transverse colon. A weighted enteric tube also appears in similar position and there is partial    visualization of a tube overlying the pelvis which may correspond to a rectal tube or Ricardo catheter. 2. A nonspecific, nonobstructive bowel gas pattern is again noted. A small amount of gas is again present within the cecum which measures approximately 3.3 cm in diameter. **This report has been created using voice recognition software. It may contain minor errors which are inherent in voice recognition technology. ** artery and right internal iliac artery, see above. Thickening of the wall of the distal gastric antrum probably due to peristalsis. Correlate clinically regarding possible gastritis/peptic ulcer disease or possible neoplasm. Numerous calcified gallstones in the gallbladder. No biliary dilatation. Enlarged prostate, elevating the base of the urinary bladder. Correlate clinically. Additional findings as detailed above. **This report has been created using voice recognition software. It may contain minor errors which are inherent in voice recognition technology. **      Final report electronically signed by Dr. Dakota Avila on 8/20/2019 2:55 AM      US COMPARISON OF OUTSIDE FILMS   Final Result             Consults:     IP CONSULT TO SPIRITUAL SERVICES  IP CONSULT TO DIETITIAN  IP CONSULT TO GI  IP CONSULT TO CARDIOLOGY  IP CONSULT TO ONCOLOGY  IP CONSULT TO GENERAL SURGERY  PALLIATIVE CARE EVAL  IP CONSULT TO DIETITIAN  IP CONSULT TO PHARMACY  IP CONSULT TO REHAB/TCU ADMISSION COORDINATOR  IP CONSULT TO SOCIAL WORK  IP CONSULT TO PAIN MANAGEMENT    Disposition: SNF  Condition at Discharge: Stable    Code Status:  Limited     Patient Instructions:    Discharge lab work:    Activity: activity as tolerated  Diet: DIET DENTAL SOFT;      Follow-up visits:   Pompa Cough, APRN - CNP  [address]  [facility]  [phone]    Schedule an appointment as soon as possible for a visit in 2 weeks  2-3 week follow-up with Len Mcdaniels or Dr. Doris Schmidt, [facility] [address]  [phone]               Discharge Medications:      Kalie Samuels   [place] Medication Instructions UDP:[id number]    Printed on:09/05/19 1538   Medication Information                      diltiazem (CARDIZEM CD) 180 MG extended release capsule  Take 1 capsule by mouth daily

## 2019-09-06 NOTE — PROGRESS NOTES
Vitals obtained and charted. Patient alert and oriented to person, place, time and situation. Speech was clear. Patient was calm and cooperative. Pupils were equal, round and responsive to light. Bilateral pupils were a size 4mm down to a size 3mm when reacting to light. Mucous membranes are pink, moist and intact. Bilateral upper extremities pink, warm and dry. Capillary refill more than 3 seconds in bilateral extremities. Radial pulses palpable at 1+ bilaterally. Hand grasps performed and noted to be moderate, but equal bilaterally. Negative for arm drift bilaterally. Respirations were unlabored and easy. Chest expansion symmetrical. Lung sounds auscultated and noted to be diminished in bilateral lower lobes, upper lobes were clear. SpO2 94% with supplemental O2 in place at 1 L/min via nasal canula. Heart tones clear, irregular. Bowel sounds active in all abdominal quadrants. Abdomen soft, distended, tender. Bilateral lower extremities pink, cool and dry. 3+ pitting edema noted to bilateral lower extremities from bilateral feet up to bilateral knees. Capillary refill less more than three seconds in bilateral lower extremities. Dorsalis pedis and post tibial pulses palpable at 1+ bilaterally. Dorsiflexion and plantar flexion performed and noted to be moderate, but equal bilaterally. Areas of bruising scattered throughout upper and lower extremities. Skin over bony prominences intact. Patient able to move all extremities with some difficulty. Patient admits tingling in lower extremities. Patient remains resting quietly in bed at this time. Bed alarm in place, and functioning properly. Denies concerns at present. Call light and water pitcher remain in reach.

## 2019-09-06 NOTE — PROGRESS NOTES
Report called to [facility] Norberto PERRY, LPN with all questions answered at this time. Chart contents placed in yellow bin. Patient transferred to the [facility]CF via [facility]. Wife was called to be informed patient was on his way to the facility.

## 2022-10-26 NOTE — PROGRESS NOTES
[address]  OCCUPATIONAL THERAPY MISSED TREATMENT NOTE  STR NEUROSCIENCES 4A  4A-17/017-A      Date: 2019  Patient Name: Ryan Manzanares        CSN: [id number]   : 1945  (69 y.o.)  Gender: male   Referring Practitioner: Jessica PADILLA  Diagnosis: fever         REASON FOR MISSED TREATMENT: pt currently having ultrasound of neck, will check back later as time allows Patient with one or more new problems requiring additional work-up/treatment.

## 2025-03-19 NOTE — PROGRESS NOTES
Assessment and Plan:        1. Atrial Fibrillation with RVR: Resolving. Amio drip. Metoprolol PO ordered at 25mg due to hypotension. Cardiology ordered an Echo, whichi showed EF 55%. Rate is more controlled. 2. Acute Respiratory Failure: Pt's baseline is RA. Pt is on 1L NC currently. Will continue to wean when appropriate. Right sided pleural effusion was noted on CT chest, US thoracentesis ordered. 3. Sepsis: Pt was given 30 cc/kg bolus. Tmax 102. WBC 4.4. Tachycardia. Procal 0.33. Continue IV Zosyn. 4. RLL PNA: CT chest showed RLL PNA. CT chest on 8/23 did not report any pneumonia. 5. Hematochezia: GI consulted. Protonix drip. They plan to scope the patient when he is more stable. Clear Liquid Diet. 6. Acute on Chronic Anemia: H&H Q6H. Hgb has been stable at 9.8-11.0. Will continue to monitor. Will discuss transfusing if Hgb drops below 7.   7. History of Colon CA, Lesion on Liver: Pt had a colon resection years ago. Pt was found to have metastatic lesions on his liver. Oncology consulted. Ct biopsy of liver will be on Monday (8/26). CT chest did not show lung masses. Bone scan did not show osteo lesions. 8. Hypokalemia: Potassium 4.0 on 8/23, Potassium Replacement Protocol is in place.      CC:  Fever   HPI:   Pt was admitted [facility]. Pt came to [facility] due to abdominal pain and diarrhea. Pt continues to have episodes of hypotension. Sepsis bolus was given and in addition, 1.5 L bolus all together. H&H Q6H have been ordered. Pt has been type & screened to be ready if the patient needs blood. GI consulted, they would like to scope when patient is more stable. Cardiology consulted to AFib RVR. Oncology consulted for metastatic lesions on the liver.    8/22 - Pt's HR has been more controlled in the last 12 hours between 75-99 bpm.    8/23 - CT Chest & Bone scan were negative for any masses or metastatic disease. CT liver biopsy will be on Monday. Pt is still SOB.  US thoracentesis ordered for pleural I offered to discuss advanced care planning, including how to pick a person who would make decisions for you if you were unable to make them for yourself, called a health care power of , and what kind of decisions you might make such as use of life sustaining treatments such as ventilators and tube feeding when faced with a life limiting illness recorded on a living will that they will need to know. (How you want to be cared for as you near the end of your natural life)     X  Patient has advanced directive written but has opted not to place them on file with the institution.   effusion.      ROS: Positive for SOB, weakness, abd pain. Negative for muscle aches.   PMH:  Per HPI  SHX:  Former smoker & denies ETOH use   FHX: No pertinent family history   Allergies: NKDA  Medications:     amiodarone 0.5 mg/min (08/23/19 1247)    [Held by provider] lactated ringers 75 mL/hr at 08/20/19 0340    pantoprozole (PROTONIX) infusion 8 mg/hr (08/23/19 0556)      magnesium replacement protocol   Other RX Placeholder    potassium (CARDIAC) replacement protocol   Other RX Placeholder    metoprolol succinate  25 mg Oral Daily    piperacillin-tazobactam  3.375 g Intravenous Q8H       Vital Signs:   /63   Pulse 99   Temp 97.4 °F (36.3 °C) (Oral)   Resp 24   Ht 6' (1.829 m)   Wt 212 lb 15.4 oz (96.6 kg)   SpO2 96%   BMI 28.88 kg/m²      Intake/Output Summary (Last 24 hours) at 8/23/2019 1341  Last data filed at 8/23/2019 4560  Gross per 24 hour   Intake 1999.17 ml   Output 450 ml   Net 1549.17 ml        General:   Chronically ill appearing white male   HEENT:  normocephalic and atraumatic. No scleral icterus. PERR. Neck: supple. No JVD. No thyromegaly. Lungs: clear to auscultation. No retractions. Breath sounds diminished. Cardiac: Irregular rate & rhythm   Abdomen: soft. Nontender. Bowel sounds positive. Extremities:  No clubbing, cyanosis, or edema x 4. Vasculature: capillary refill < 3 seconds. Palpable LE pulses bilaterally. Skin:  warm and dry. Psych:  Alert and oriented x3. Affect appropriate  Lymph:  No supraclavicular adenopathy. Neurologic:  No focal deficit. No seizures. Data: (All radiographs, tracings, PFTs, and imaging are personally viewed and interpreted unless otherwise noted). · Sodium 137. Potassium 4.0. Mg 1.5. CO2 27. BUN 9. Creatinine 0.6. AG 8.0. WBC 4.4. Hgb 11.0. Plts 205. · CT abd & pelvis Report: Marked hepatomegaly with extensive metastases. Mild ascites in the abdomen and pelvis. Right lower lobe atelectasis, cannot exclude pneumonia.

## (undated) DEVICE — SET LNR RED GRN W/ BASE CLEANASCOPE

## (undated) DEVICE — SYRINGE, 10ML SALINE IN 10ML: Brand: MEDLINE

## (undated) DEVICE — SOLUTION IV 1000ML 0.45% SOD CHL PH 5 INJ USP VIAFLX PLAS

## (undated) DEVICE — ENDO KIT: Brand: MEDLINE INDUSTRIES, INC.

## (undated) DEVICE — SET ADMIN 25ML L117IN PMP MOD CK VLV RLER CLMP 2 SMRTSITE

## (undated) DEVICE — CONNECTOR TBNG AUX H2O JET DISP FOR OLY 160/180 SER

## (undated) DEVICE — 14FR COLON DECOMPRESSION SET: Brand: COOK

## (undated) DEVICE — CLIP INT L235CM WRK CHN DIA2.8MM OPN 11MM BRAID CATH ROT BX/10

## (undated) DEVICE — TUBING, SUCTION, 1/4" X 20', STRAIGHT: Brand: MEDLINE INDUSTRIES, INC.

## (undated) DEVICE — Device

## (undated) DEVICE — YANKAUER,BULB TIP,W/O VENT,RIGID,STERILE: Brand: MEDLINE

## (undated) DEVICE — FORCEPS BX L240CM JAW DIA3.2MM L CAP W/ NDL MIC MESH TOOTH